# Patient Record
Sex: MALE | Race: WHITE | NOT HISPANIC OR LATINO | Employment: FULL TIME | URBAN - METROPOLITAN AREA
[De-identification: names, ages, dates, MRNs, and addresses within clinical notes are randomized per-mention and may not be internally consistent; named-entity substitution may affect disease eponyms.]

---

## 2017-02-23 ENCOUNTER — LAB REQUISITION (OUTPATIENT)
Dept: LAB | Facility: HOSPITAL | Age: 57
End: 2017-02-23
Payer: COMMERCIAL

## 2017-02-23 DIAGNOSIS — J01.00 ACUTE MAXILLARY SINUSITIS: ICD-10-CM

## 2017-02-23 PROCEDURE — 87147 CULTURE TYPE IMMUNOLOGIC: CPT | Performed by: OTOLARYNGOLOGY

## 2017-02-23 PROCEDURE — 87205 SMEAR GRAM STAIN: CPT | Performed by: OTOLARYNGOLOGY

## 2017-02-23 PROCEDURE — 87070 CULTURE OTHR SPECIMN AEROBIC: CPT | Performed by: OTOLARYNGOLOGY

## 2017-02-23 PROCEDURE — 87186 SC STD MICRODIL/AGAR DIL: CPT | Performed by: OTOLARYNGOLOGY

## 2017-02-27 LAB
BACTERIA WND AEROBE CULT: NORMAL
BACTERIA WND AEROBE CULT: NORMAL
GRAM STN SPEC: NORMAL
GRAM STN SPEC: NORMAL

## 2017-03-29 ENCOUNTER — LAB REQUISITION (OUTPATIENT)
Dept: LAB | Facility: HOSPITAL | Age: 57
End: 2017-03-29
Payer: COMMERCIAL

## 2017-03-29 DIAGNOSIS — H60.399 OTHER INFECTIVE OTITIS EXTERNA, UNSPECIFIED EAR: ICD-10-CM

## 2017-03-29 PROCEDURE — 87102 FUNGUS ISOLATION CULTURE: CPT | Performed by: OTOLARYNGOLOGY

## 2017-03-29 PROCEDURE — 87205 SMEAR GRAM STAIN: CPT | Performed by: OTOLARYNGOLOGY

## 2017-03-29 PROCEDURE — 87070 CULTURE OTHR SPECIMN AEROBIC: CPT | Performed by: OTOLARYNGOLOGY

## 2017-03-29 PROCEDURE — 87107 FUNGI IDENTIFICATION MOLD: CPT | Performed by: OTOLARYNGOLOGY

## 2017-03-29 PROCEDURE — 87147 CULTURE TYPE IMMUNOLOGIC: CPT | Performed by: OTOLARYNGOLOGY

## 2017-03-31 LAB
BACTERIA WND AEROBE CULT: NORMAL
GRAM STN SPEC: NORMAL

## 2017-04-10 LAB — FUNGUS SPEC CULT: NORMAL

## 2017-08-31 ENCOUNTER — TRANSCRIBE ORDERS (OUTPATIENT)
Dept: ADMINISTRATIVE | Facility: HOSPITAL | Age: 57
End: 2017-08-31

## 2017-08-31 DIAGNOSIS — H70.11 CHRONIC MASTOIDITIS OF RIGHT SIDE: Primary | ICD-10-CM

## 2017-09-03 ENCOUNTER — HOSPITAL ENCOUNTER (OUTPATIENT)
Dept: CT IMAGING | Facility: HOSPITAL | Age: 57
Discharge: HOME | End: 2017-09-03
Attending: OTOLARYNGOLOGY
Payer: COMMERCIAL

## 2017-09-03 DIAGNOSIS — H70.11 CHRONIC MASTOIDITIS OF RIGHT SIDE: ICD-10-CM

## 2017-09-03 PROCEDURE — 70480 CT ORBIT/EAR/FOSSA W/O DYE: CPT

## 2019-07-10 PROCEDURE — 87070 CULTURE OTHR SPECIMN AEROBIC: CPT | Performed by: OTOLARYNGOLOGY

## 2019-07-10 PROCEDURE — 87205 SMEAR GRAM STAIN: CPT | Performed by: OTOLARYNGOLOGY

## 2019-07-10 PROCEDURE — 87102 FUNGUS ISOLATION CULTURE: CPT | Performed by: OTOLARYNGOLOGY

## 2019-08-18 ENCOUNTER — APPOINTMENT (EMERGENCY)
Dept: RADIOLOGY | Facility: HOSPITAL | Age: 59
End: 2019-08-18
Payer: COMMERCIAL

## 2019-08-18 ENCOUNTER — HOSPITAL ENCOUNTER (OUTPATIENT)
Facility: HOSPITAL | Age: 59
Setting detail: OBSERVATION
Discharge: HOME | End: 2019-08-19
Attending: EMERGENCY MEDICINE | Admitting: INTERNAL MEDICINE
Payer: COMMERCIAL

## 2019-08-18 DIAGNOSIS — Z90.5 SINGLE KIDNEY: ICD-10-CM

## 2019-08-18 DIAGNOSIS — M54.50 ACUTE RIGHT-SIDED LOW BACK PAIN WITHOUT SCIATICA: ICD-10-CM

## 2019-08-18 DIAGNOSIS — N20.0 NEPHROLITHIASIS: Primary | ICD-10-CM

## 2019-08-18 PROBLEM — G89.29 CHRONIC BACK PAIN: Status: ACTIVE | Noted: 2019-08-18

## 2019-08-18 PROBLEM — M54.9 CHRONIC BACK PAIN: Status: ACTIVE | Noted: 2019-08-18

## 2019-08-18 LAB
ALBUMIN SERPL BCP-MCNC: 4.6 G/DL (ref 3.5–5)
ALP SERPL-CCNC: 139 U/L (ref 46–116)
ALT SERPL W P-5'-P-CCNC: 22 U/L (ref 12–78)
AMORPH URATE CRY URNS QL MICRO: ABNORMAL /HPF
ANION GAP SERPL CALCULATED.3IONS-SCNC: 6 MMOL/L (ref 4–13)
AST SERPL W P-5'-P-CCNC: 15 U/L (ref 5–45)
BACTERIA UR QL AUTO: ABNORMAL /HPF
BASOPHILS # BLD AUTO: 0.04 THOUSANDS/ΜL (ref 0–0.1)
BASOPHILS NFR BLD AUTO: 0 % (ref 0–1)
BILIRUB SERPL-MCNC: 0.42 MG/DL (ref 0.2–1)
BILIRUB UR QL STRIP: NEGATIVE
BILIRUB UR QL STRIP: NEGATIVE
BUN SERPL-MCNC: 22 MG/DL (ref 5–25)
CALCIUM SERPL-MCNC: 9.4 MG/DL (ref 8.3–10.1)
CHLORIDE SERPL-SCNC: 110 MMOL/L (ref 100–108)
CLARITY UR: ABNORMAL
CLARITY UR: CLEAR
CO2 SERPL-SCNC: 26 MMOL/L (ref 21–32)
COLOR UR: ABNORMAL
COLOR UR: ABNORMAL
COLOR, POC: NORMAL
CREAT SERPL-MCNC: 1.14 MG/DL (ref 0.6–1.3)
EOSINOPHIL # BLD AUTO: 0.15 THOUSAND/ΜL (ref 0–0.61)
EOSINOPHIL NFR BLD AUTO: 2 % (ref 0–6)
ERYTHROCYTE [DISTWIDTH] IN BLOOD BY AUTOMATED COUNT: 13.5 % (ref 11.6–15.1)
GFR SERPL CREATININE-BSD FRML MDRD: 70 ML/MIN/1.73SQ M
GLUCOSE SERPL-MCNC: 111 MG/DL (ref 65–140)
GLUCOSE UR STRIP-MCNC: NEGATIVE MG/DL
GLUCOSE UR STRIP-MCNC: NEGATIVE MG/DL
HCT VFR BLD AUTO: 46.3 % (ref 36.5–49.3)
HGB BLD-MCNC: 15.3 G/DL (ref 12–17)
HGB UR QL STRIP.AUTO: ABNORMAL
HGB UR QL STRIP.AUTO: ABNORMAL
IMM GRANULOCYTES # BLD AUTO: 0.04 THOUSAND/UL (ref 0–0.2)
IMM GRANULOCYTES NFR BLD AUTO: 0 % (ref 0–2)
KETONES UR STRIP-MCNC: NEGATIVE MG/DL
KETONES UR STRIP-MCNC: NEGATIVE MG/DL
LEUKOCYTE ESTERASE UR QL STRIP: ABNORMAL
LEUKOCYTE ESTERASE UR QL STRIP: ABNORMAL
LYMPHOCYTES # BLD AUTO: 1.43 THOUSANDS/ΜL (ref 0.6–4.47)
LYMPHOCYTES NFR BLD AUTO: 14 % (ref 14–44)
MCH RBC QN AUTO: 30.3 PG (ref 26.8–34.3)
MCHC RBC AUTO-ENTMCNC: 33 G/DL (ref 31.4–37.4)
MCV RBC AUTO: 92 FL (ref 82–98)
MONOCYTES # BLD AUTO: 0.69 THOUSAND/ΜL (ref 0.17–1.22)
MONOCYTES NFR BLD AUTO: 7 % (ref 4–12)
NEUTROPHILS # BLD AUTO: 7.91 THOUSANDS/ΜL (ref 1.85–7.62)
NEUTS SEG NFR BLD AUTO: 77 % (ref 43–75)
NITRITE UR QL STRIP: NEGATIVE
NITRITE UR QL STRIP: NEGATIVE
NON-SQ EPI CELLS URNS QL MICRO: ABNORMAL /HPF
NRBC BLD AUTO-RTO: 0 /100 WBCS
PH UR STRIP.AUTO: 6.5 [PH]
PH UR STRIP.AUTO: 6.5 [PH] (ref 4.5–8)
PLATELET # BLD AUTO: 280 THOUSANDS/UL (ref 149–390)
PMV BLD AUTO: 10.2 FL (ref 8.9–12.7)
POTASSIUM SERPL-SCNC: 4.7 MMOL/L (ref 3.5–5.3)
PROT SERPL-MCNC: 7.4 G/DL (ref 6.4–8.2)
PROT UR STRIP-MCNC: ABNORMAL MG/DL
PROT UR STRIP-MCNC: ABNORMAL MG/DL
RBC # BLD AUTO: 5.05 MILLION/UL (ref 3.88–5.62)
RBC #/AREA URNS AUTO: ABNORMAL /HPF
SODIUM SERPL-SCNC: 142 MMOL/L (ref 136–145)
SP GR UR STRIP.AUTO: 1.01 (ref 1–1.03)
SP GR UR STRIP.AUTO: 1.01 (ref 1–1.03)
UROBILINOGEN UR QL STRIP.AUTO: 0.2 E.U./DL
UROBILINOGEN UR QL STRIP.AUTO: 0.2 E.U./DL
WBC # BLD AUTO: 10.26 THOUSAND/UL (ref 4.31–10.16)
WBC #/AREA URNS AUTO: ABNORMAL /HPF

## 2019-08-18 PROCEDURE — 93005 ELECTROCARDIOGRAM TRACING: CPT

## 2019-08-18 PROCEDURE — 96374 THER/PROPH/DIAG INJ IV PUSH: CPT

## 2019-08-18 PROCEDURE — 99285 EMERGENCY DEPT VISIT HI MDM: CPT

## 2019-08-18 PROCEDURE — 96361 HYDRATE IV INFUSION ADD-ON: CPT

## 2019-08-18 PROCEDURE — 80053 COMPREHEN METABOLIC PANEL: CPT | Performed by: EMERGENCY MEDICINE

## 2019-08-18 PROCEDURE — 99220 PR INITIAL OBSERVATION CARE/DAY 70 MINUTES: CPT | Performed by: INTERNAL MEDICINE

## 2019-08-18 PROCEDURE — 81001 URINALYSIS AUTO W/SCOPE: CPT | Performed by: STUDENT IN AN ORGANIZED HEALTH CARE EDUCATION/TRAINING PROGRAM

## 2019-08-18 PROCEDURE — 99285 EMERGENCY DEPT VISIT HI MDM: CPT | Performed by: EMERGENCY MEDICINE

## 2019-08-18 PROCEDURE — 36415 COLL VENOUS BLD VENIPUNCTURE: CPT | Performed by: EMERGENCY MEDICINE

## 2019-08-18 PROCEDURE — 85025 COMPLETE CBC W/AUTO DIFF WBC: CPT | Performed by: EMERGENCY MEDICINE

## 2019-08-18 PROCEDURE — 74176 CT ABD & PELVIS W/O CONTRAST: CPT

## 2019-08-18 RX ORDER — SODIUM CHLORIDE, SODIUM GLUCONATE, SODIUM ACETATE, POTASSIUM CHLORIDE, MAGNESIUM CHLORIDE, SODIUM PHOSPHATE, DIBASIC, AND POTASSIUM PHOSPHATE .53; .5; .37; .037; .03; .012; .00082 G/100ML; G/100ML; G/100ML; G/100ML; G/100ML; G/100ML; G/100ML
150 INJECTION, SOLUTION INTRAVENOUS CONTINUOUS
Status: DISCONTINUED | OUTPATIENT
Start: 2019-08-18 | End: 2019-08-19

## 2019-08-18 RX ORDER — CIPROFLOXACIN AND DEXAMETHASONE 3; 1 MG/ML; MG/ML
4 SUSPENSION/ DROPS AURICULAR (OTIC) 2 TIMES DAILY
Status: DISCONTINUED | OUTPATIENT
Start: 2019-08-19 | End: 2019-08-19 | Stop reason: HOSPADM

## 2019-08-18 RX ORDER — ONDANSETRON 2 MG/ML
4 INJECTION INTRAMUSCULAR; INTRAVENOUS ONCE
Status: DISCONTINUED | OUTPATIENT
Start: 2019-08-18 | End: 2019-08-18

## 2019-08-18 RX ORDER — KETOROLAC TROMETHAMINE 30 MG/ML
15 INJECTION, SOLUTION INTRAMUSCULAR; INTRAVENOUS ONCE
Status: DISCONTINUED | OUTPATIENT
Start: 2019-08-18 | End: 2019-08-18

## 2019-08-18 RX ORDER — LIDOCAINE 50 MG/G
1 PATCH TOPICAL ONCE
Status: COMPLETED | OUTPATIENT
Start: 2019-08-18 | End: 2019-08-19

## 2019-08-18 RX ORDER — ACETAMINOPHEN 325 MG/1
650 TABLET ORAL EVERY 6 HOURS PRN
Status: DISCONTINUED | OUTPATIENT
Start: 2019-08-18 | End: 2019-08-19 | Stop reason: HOSPADM

## 2019-08-18 RX ORDER — NICOTINE 21 MG/24HR
1 PATCH, TRANSDERMAL 24 HOURS TRANSDERMAL DAILY
Status: DISCONTINUED | OUTPATIENT
Start: 2019-08-19 | End: 2019-08-19 | Stop reason: HOSPADM

## 2019-08-18 RX ORDER — GABAPENTIN 300 MG/1
300 CAPSULE ORAL 2 TIMES DAILY
Status: DISCONTINUED | OUTPATIENT
Start: 2019-08-19 | End: 2019-08-19 | Stop reason: HOSPADM

## 2019-08-18 RX ORDER — DIAZEPAM 5 MG/ML
5 INJECTION, SOLUTION INTRAMUSCULAR; INTRAVENOUS ONCE
Status: COMPLETED | OUTPATIENT
Start: 2019-08-18 | End: 2019-08-18

## 2019-08-18 RX ORDER — METHOCARBAMOL 500 MG/1
500 TABLET, FILM COATED ORAL ONCE
Status: COMPLETED | OUTPATIENT
Start: 2019-08-18 | End: 2019-08-18

## 2019-08-18 RX ORDER — TAMSULOSIN HYDROCHLORIDE 0.4 MG/1
0.4 CAPSULE ORAL
Status: DISCONTINUED | OUTPATIENT
Start: 2019-08-19 | End: 2019-08-19

## 2019-08-18 RX ADMIN — METHOCARBAMOL TABLETS 500 MG: 500 TABLET, COATED ORAL at 18:14

## 2019-08-18 RX ADMIN — SODIUM CHLORIDE, SODIUM GLUCONATE, SODIUM ACETATE, POTASSIUM CHLORIDE, MAGNESIUM CHLORIDE, SODIUM PHOSPHATE, DIBASIC, AND POTASSIUM PHOSPHATE 150 ML/HR: .53; .5; .37; .037; .03; .012; .00082 INJECTION, SOLUTION INTRAVENOUS at 23:28

## 2019-08-18 RX ADMIN — LIDOCAINE 1 PATCH: 50 PATCH CUTANEOUS at 18:15

## 2019-08-18 RX ADMIN — DIAZEPAM 5 MG: 10 INJECTION, SOLUTION INTRAMUSCULAR; INTRAVENOUS at 18:17

## 2019-08-18 RX ADMIN — SODIUM CHLORIDE 1000 ML: 0.9 INJECTION, SOLUTION INTRAVENOUS at 19:32

## 2019-08-18 NOTE — ED ATTENDING ATTESTATION
Lisa Vila MD, saw and evaluated the patient  I have discussed the patient with the resident/non-physician practitioner and agree with the resident's/non-physician practitioner's findings, Plan of Care, and MDM as documented in the resident's/non-physician practitioner's note, except where noted  All available labs and Radiology studies were reviewed  I was present for key portions of any procedure(s) performed by the resident/non-physician practitioner and I was immediately available to provide assistance  At this point I agree with the current assessment done in the Emergency Department  I have conducted an independent evaluation of this patient a history and physical is as follows:    OA: 59 y/o m c/o back pain  Pt has a history of chronic back pain but notes that at  4pm today, he had acute onset of back pain  Sharp, radiating from right lumbar/flank/rib region  Denies radiation to his groin/testicles or down his leg  Denies associated numbness/weakness/tingling  Notes that his typical back pain is sacral and radiates upwards, dull and constant and generally does not occur in spasms  Pain is worse with ambulation  + nausea and vomiting x 1  Initially felt lightheaded during nausea, currently resolved  Otherwise the pt denies any abdominal pain, no urinary sxms  No cp/pressure/sob  No change in stools  No fevers/chills  Pt took aleve prior to arrival with mild relief  Denies PMH, surgeries or daily medications  Does have a history of OE currently treated with drops  PE, well developed m in NAD, VSS, Nc/AT, MMM, anicteric, neck supple/FROM, RR, lungs CTAB, -w/r, abd soft, NT/ND, +BS, -r/g, - palpable mass + ttp over R lumbar paraspinal region, - LE edema/calf ttp, + 2 distal pulses and capillary refill < 2 sec, -straight leg raise, intact babinski, patellar and ankle reflexes, 5/5 strength and intact sensation x 4, AAO  A/p back pain, msk v kidney stone v infectious process   Labs, u/a, imaging   Gentle IVF hydration, treat accordingly    Critical Care Time  Procedures

## 2019-08-19 ENCOUNTER — APPOINTMENT (OUTPATIENT)
Dept: RADIOLOGY | Facility: HOSPITAL | Age: 59
End: 2019-08-19
Payer: COMMERCIAL

## 2019-08-19 ENCOUNTER — TELEPHONE (OUTPATIENT)
Dept: OTHER | Facility: HOSPITAL | Age: 59
End: 2019-08-19

## 2019-08-19 VITALS
TEMPERATURE: 97.7 F | DIASTOLIC BLOOD PRESSURE: 81 MMHG | BODY MASS INDEX: 27.21 KG/M2 | HEART RATE: 47 BPM | OXYGEN SATURATION: 96 % | RESPIRATION RATE: 17 BRPM | HEIGHT: 74 IN | WEIGHT: 212 LBS | SYSTOLIC BLOOD PRESSURE: 149 MMHG

## 2019-08-19 PROBLEM — Z72.0 TOBACCO ABUSE: Status: ACTIVE | Noted: 2019-08-19

## 2019-08-19 LAB
ANION GAP SERPL CALCULATED.3IONS-SCNC: 4 MMOL/L (ref 4–13)
ATRIAL RATE: 67 BPM
BUN SERPL-MCNC: 19 MG/DL (ref 5–25)
CALCIUM SERPL-MCNC: 8.5 MG/DL (ref 8.3–10.1)
CHLORIDE SERPL-SCNC: 110 MMOL/L (ref 100–108)
CO2 SERPL-SCNC: 25 MMOL/L (ref 21–32)
CREAT SERPL-MCNC: 0.89 MG/DL (ref 0.6–1.3)
ERYTHROCYTE [DISTWIDTH] IN BLOOD BY AUTOMATED COUNT: 13.7 % (ref 11.6–15.1)
GFR SERPL CREATININE-BSD FRML MDRD: 93 ML/MIN/1.73SQ M
GLUCOSE SERPL-MCNC: 92 MG/DL (ref 65–140)
HCT VFR BLD AUTO: 42.8 % (ref 36.5–49.3)
HGB BLD-MCNC: 13.6 G/DL (ref 12–17)
INR PPP: 1.08 (ref 0.84–1.19)
MCH RBC QN AUTO: 29.6 PG (ref 26.8–34.3)
MCHC RBC AUTO-ENTMCNC: 31.8 G/DL (ref 31.4–37.4)
MCV RBC AUTO: 93 FL (ref 82–98)
P AXIS: 74 DEGREES
PLATELET # BLD AUTO: 257 THOUSANDS/UL (ref 149–390)
PMV BLD AUTO: 10.5 FL (ref 8.9–12.7)
POTASSIUM SERPL-SCNC: 4.1 MMOL/L (ref 3.5–5.3)
PR INTERVAL: 146 MS
PROTHROMBIN TIME: 13.6 SECONDS (ref 11.6–14.5)
QRS AXIS: 57 DEGREES
QRSD INTERVAL: 84 MS
QT INTERVAL: 384 MS
QTC INTERVAL: 405 MS
RBC # BLD AUTO: 4.59 MILLION/UL (ref 3.88–5.62)
SODIUM SERPL-SCNC: 139 MMOL/L (ref 136–145)
T WAVE AXIS: 75 DEGREES
VENTRICULAR RATE: 67 BPM
WBC # BLD AUTO: 7.9 THOUSAND/UL (ref 4.31–10.16)

## 2019-08-19 PROCEDURE — 93010 ELECTROCARDIOGRAM REPORT: CPT | Performed by: INTERNAL MEDICINE

## 2019-08-19 PROCEDURE — 85610 PROTHROMBIN TIME: CPT | Performed by: INTERNAL MEDICINE

## 2019-08-19 PROCEDURE — 82360 CALCULUS ASSAY QUANT: CPT | Performed by: STUDENT IN AN ORGANIZED HEALTH CARE EDUCATION/TRAINING PROGRAM

## 2019-08-19 PROCEDURE — 85027 COMPLETE CBC AUTOMATED: CPT | Performed by: STUDENT IN AN ORGANIZED HEALTH CARE EDUCATION/TRAINING PROGRAM

## 2019-08-19 PROCEDURE — 99244 OFF/OP CNSLTJ NEW/EST MOD 40: CPT | Performed by: PHYSICIAN ASSISTANT

## 2019-08-19 PROCEDURE — NC001 PR NO CHARGE: Performed by: INTERNAL MEDICINE

## 2019-08-19 PROCEDURE — 74176 CT ABD & PELVIS W/O CONTRAST: CPT

## 2019-08-19 PROCEDURE — 80048 BASIC METABOLIC PNL TOTAL CA: CPT | Performed by: STUDENT IN AN ORGANIZED HEALTH CARE EDUCATION/TRAINING PROGRAM

## 2019-08-19 RX ORDER — TAMSULOSIN HYDROCHLORIDE 0.4 MG/1
0.4 CAPSULE ORAL
Qty: 30 CAPSULE | Refills: 0 | Status: SHIPPED | OUTPATIENT
Start: 2019-08-20

## 2019-08-19 RX ORDER — TAMSULOSIN HYDROCHLORIDE 0.4 MG/1
0.4 CAPSULE ORAL
Status: DISCONTINUED | OUTPATIENT
Start: 2019-08-19 | End: 2019-08-19 | Stop reason: HOSPADM

## 2019-08-19 RX ORDER — ACETAMINOPHEN 325 MG/1
650 TABLET ORAL EVERY 6 HOURS PRN
Qty: 30 TABLET | Refills: 0 | Status: SHIPPED | OUTPATIENT
Start: 2019-08-19 | End: 2020-02-05

## 2019-08-19 RX ADMIN — GABAPENTIN 300 MG: 300 CAPSULE ORAL at 08:42

## 2019-08-19 NOTE — PLAN OF CARE
Problem: Potential for Falls  Goal: Patient will remain free of falls  Description  INTERVENTIONS:  - Assess patient frequently for physical needs  -  Identify cognitive and physical deficits and behaviors that affect risk of falls    -  [place] fall precautions as indicated by assessment   - Educate patient/family on patient safety including physical limitations  - Instruct patient to call for assistance with activity based on assessment  - Modify environment to reduce risk of injury  - Consider OT/PT consult to assist with strengthening/mobility  Outcome: Adequate for Discharge     Problem: PAIN - ADULT  Goal: Verbalizes/displays adequate comfort level or baseline comfort level  Description  Interventions:  - Encourage patient to monitor pain and request assistance  - Assess pain using appropriate pain scale  - Administer analgesics based on type and severity of pain and evaluate response  - Implement non-pharmacological measures as appropriate and evaluate response  - Consider cultural and social influences on pain and pain management  - Notify physician/advanced practitioner if interventions unsuccessful or patient reports new pain  Outcome: Adequate for Discharge     Problem: INFECTION - ADULT  Goal: Absence or prevention of progression during hospitalization  Description  INTERVENTIONS:  - Assess and monitor for signs and symptoms of infection  - Monitor lab/diagnostic results  - Monitor all insertion sites, i e  indwelling lines, tubes, and drains  - Monitor endotracheal if appropriate and nasal secretions for changes in amount and color  - [place] appropriate cooling/warming therapies per order  - Administer medications as ordered  - Instruct and encourage patient and family to use good hand hygiene technique  - Identify and instruct in appropriate isolation precautions for identified infection/condition  Outcome: Adequate for Discharge  Goal: Absence of fever/infection during neutropenic period  Description  INTERVENTIONS:  - Monitor WBC    Outcome: Adequate for Discharge     Problem: SAFETY ADULT  Goal: Maintain or return to baseline ADL function  Description  INTERVENTIONS:  -  Assess patient's ability to carry out ADLs; assess patient's baseline for ADL function and identify physical deficits which impact ability to perform ADLs (bathing, care of mouth/teeth, toileting, grooming, dressing, etc )  - Assess/evaluate cause of self-care deficits   - Assess range of motion  - Assess patient's mobility; develop plan if impaired  - Assess patient's need for assistive devices and provide as appropriate  - Encourage maximum independence but intervene and supervise when necessary  - Involve family in performance of ADLs  - Assess for home care needs following discharge   - Consider OT consult to assist with ADL evaluation and planning for discharge  - Provide patient education as appropriate  Outcome: Adequate for Discharge  Goal: Maintain or return mobility status to optimal level  Description  INTERVENTIONS:  - Assess patient's baseline mobility status (ambulation, transfers, stairs, etc )    - Identify cognitive and physical deficits and behaviors that affect mobility  - Identify mobility aids required to assist with transfers and/or ambulation (gait belt, sit-to-stand, lift, walker, cane, etc )  - [place] fall precautions as indicated by assessment  - Record patient progress and toleration of activity level on Mobility SBAR; progress patient to next Phase/Stage  - Instruct patient to call for assistance with activity based on assessment  - Consider rehabilitation consult to assist with strengthening/weightbearing, etc   Outcome: Adequate for Discharge     Problem: DISCHARGE PLANNING  Goal: Discharge to home or other facility with appropriate resources  Description  INTERVENTIONS:  - Identify barriers to discharge w/patient and caregiver  - Arrange for needed discharge resources and transportation as appropriate  - Identify discharge learning needs (meds, wound care, etc )  - Arrange for interpretive services to assist at discharge as needed  - Refer to Case Management Department for coordinating discharge planning if the patient needs post-hospital services based on physician/advanced practitioner order or complex needs related to functional status, cognitive ability, or social support system  Outcome: Adequate for Discharge     Problem: Knowledge Deficit  Goal: Patient/family/caregiver demonstrates understanding of disease process, treatment plan, medications, and discharge instructions  Description  Complete learning assessment and assess knowledge base    Interventions:  - Provide teaching at level of understanding  - Provide teaching via preferred learning methods  Outcome: Adequate for Discharge

## 2019-08-19 NOTE — PLAN OF CARE
Problem: Potential for Falls  Goal: Patient will remain free of falls  Description  INTERVENTIONS:  - Assess patient frequently for physical needs  -  Identify cognitive and physical deficits and behaviors that affect risk of falls    -  [place] fall precautions as indicated by assessment   - Educate patient/family on patient safety including physical limitations  - Instruct patient to call for assistance with activity based on assessment  - Modify environment to reduce risk of injury  - Consider OT/PT consult to assist with strengthening/mobility  Outcome: Progressing     Problem: PAIN - ADULT  Goal: Verbalizes/displays adequate comfort level or baseline comfort level  Description  Interventions:  - Encourage patient to monitor pain and request assistance  - Assess pain using appropriate pain scale  - Administer analgesics based on type and severity of pain and evaluate response  - Implement non-pharmacological measures as appropriate and evaluate response  - Consider cultural and social influences on pain and pain management  - Notify physician/advanced practitioner if interventions unsuccessful or patient reports new pain  Outcome: Progressing     Problem: INFECTION - ADULT  Goal: Absence or prevention of progression during hospitalization  Description  INTERVENTIONS:  - Assess and monitor for signs and symptoms of infection  - Monitor lab/diagnostic results  - Monitor all insertion sites, i e  indwelling lines, tubes, and drains  - Monitor endotracheal if appropriate and nasal secretions for changes in amount and color  - [place] appropriate cooling/warming therapies per order  - Administer medications as ordered  - Instruct and encourage patient and family to use good hand hygiene technique  - Identify and instruct in appropriate isolation precautions for identified infection/condition  Outcome: Progressing  Goal: Absence of fever/infection during neutropenic period  Description  INTERVENTIONS:  - Monitor WBC    Outcome: Progressing     Problem: SAFETY ADULT  Goal: Maintain or return to baseline ADL function  Description  INTERVENTIONS:  -  Assess patient's ability to carry out ADLs; assess patient's baseline for ADL function and identify physical deficits which impact ability to perform ADLs (bathing, care of mouth/teeth, toileting, grooming, dressing, etc )  - Assess/evaluate cause of self-care deficits   - Assess range of motion  - Assess patient's mobility; develop plan if impaired  - Assess patient's need for assistive devices and provide as appropriate  - Encourage maximum independence but intervene and supervise when necessary  - Involve family in performance of ADLs  - Assess for home care needs following discharge   - Consider OT consult to assist with ADL evaluation and planning for discharge  - Provide patient education as appropriate  Outcome: Progressing  Goal: Maintain or return mobility status to optimal level  Description  INTERVENTIONS:  - Assess patient's baseline mobility status (ambulation, transfers, stairs, etc )    - Identify cognitive and physical deficits and behaviors that affect mobility  - Identify mobility aids required to assist with transfers and/or ambulation (gait belt, sit-to-stand, lift, walker, cane, etc )  - [place] fall precautions as indicated by assessment  - Record patient progress and toleration of activity level on Mobility SBAR; progress patient to next Phase/Stage  - Instruct patient to call for assistance with activity based on assessment  - Consider rehabilitation consult to assist with strengthening/weightbearing, etc   Outcome: Progressing     Problem: DISCHARGE PLANNING  Goal: Discharge to home or other facility with appropriate resources  Description  INTERVENTIONS:  - Identify barriers to discharge w/patient and caregiver  - Arrange for needed discharge resources and transportation as appropriate  - Identify discharge learning needs (meds, wound care, etc )  - Arrange for interpretive services to assist at discharge as needed  - Refer to Case Management Department for coordinating discharge planning if the patient needs post-hospital services based on physician/advanced practitioner order or complex needs related to functional status, cognitive ability, or social support system  Outcome: Progressing     Problem: Knowledge Deficit  Goal: Patient/family/caregiver demonstrates understanding of disease process, treatment plan, medications, and discharge instructions  Description  Complete learning assessment and assess knowledge base    Interventions:  - Provide teaching at level of understanding  - Provide teaching via preferred learning methods  Outcome: Progressing

## 2019-08-19 NOTE — TELEPHONE ENCOUNTER
Castro Ortiz is a 28-year-old male seen in consultation at [address] [facility] for obstructing ureteral calculus with spontaneous expulsion  Please contact patient for 4 week follow-up with Tai Ron  Thank you

## 2019-08-19 NOTE — H&P
History & Physical - SOD A      PATIENT INFORMATION      Alli Barraza 60 y o  male MRN: [id number]  Unit/Bed#: -04 Encounter: [id number]  Admitting Physician: Molly Srivastava DO  PCP: No primary care provider on file  Date of Admission:  08/18/19      ASSESSMENTS & PLAN     No problem-specific Assessment & Plan notes found for this encounter  1   Nephrolithiasis  · Vital stable, WBC of 10 26, UA showed blood  · CT scan showed 4 mm obstructing calculus in the right ureterovesical junction  · Currently on 150 cc of isolyte  · Flomax 0 4 mg ordered  · Tylenol 650 prn ordered, Morphine 2 mg injection p r n  ordered  · Urine strain ordered  · Patient feels much better than on presentation  He is making good urine output, normal color  · Follow up CBC and BMP, Protime Ordered  · Can consider urology consult  · Continue to monitor  2   Tobacco abuse  · Has 35 pack year history  · Nicotine patch ordered  · Counseled on the risks of tobacco abuse  · Currently no PCP, told to follow up for regular screenings    3  Chronic back pain  · Currently stable  · Continue gabapentin 300 mg BID  · Continue to monitor    3  Chronic otitis externa  · Currently stable  · Continue cipro-dexa drops  · Continue to monitor    VTE Pharmacologic Prophylaxis: Reason for no pharmacologic prophylaxis low risk   VTE Mechanical Prophylaxis: SCD's      CHIEF COMPLAINT      Flank pain      HISTORY OF PRESENT ILLNESS      Alli Barraza is a 60 y o  male past medical history significant for chronic back pain and tobacco abuse who presents with right-sided flank pain since 3:00 p m  Patient describes as sharp, 10/10 pain that radiates from the right side of the back to the groin  He denies any history of kidney stones  Denies any dysuria or hematuria  Was able to urinate  Took 2 Aleve to no relief  Pain was accompanied by nausea and 1 episode of vomiting    Denied any other symptoms including fevers, chills, chest pain, shortness of breath  In ED, vital signs are stable, WBC noted to be 10 26, UA showed large blood  CT scan showed 4 mm obstructing calculus in the right UV junction  Incidental finding of no left kidney and splenic cyst   Was given Lidoderm patch, 1 L nasal saline bolus  Currently patient seen examined lying in bed in no acute distress  Accompanied by daughter  He states pain has vastly improved since presentation, about a 1/10 with some soreness  He did urinate after being moved to the floors, states it was large amount, normal color, did not note stone  Denies any other acute complaints at this time  States he does not have PCP, has not had physical for 10 years  Last saw an otolaryngologist for chronic otitis externa  REVIEW OF SYSTEMS       ROS  CONSTITUTIONAL: Denies any fever, chills  HEENT: No earache or tinnitus  Denies hearing loss or visual disturbances  CARDIOVASCULAR: No chest pain or palpitations  RESPIRATORY: Denies any cough, hemoptysis, shortness of breath or dyspnea on exertion  GASTROINTESTINAL: Denies abdominal pain, nausea, vomitng   GENITOURINARY: Positive flank pain  No problems with urination  Denies any hematuria or dysuria  NEUROLOGIC: No dizziness or vertigo, denies headaches  MUSCULOSKELETAL: Denies any muscle or joint pain  SKIN: Denies skin rashes or itching  All other systems reviewed and are negative  PAST MEDICAL & SURGICAL HISTORY      Past Medical History:   Diagnosis Date    Arthritis of back     Scoliosis        History reviewed  No pertinent surgical history  MEDICATIONS & ALLERGIES     Prior to Admission medications    Medication Sig Start Date End Date Taking?  Authorizing Provider   ciprofloxacin-dexamethasone (CIPRODEX) otic suspension Administer 4 drops into the left ear 2 (two) times a day 7/10/19   Jamel Deras MD   ciprofloxacin-dexamethasone [facility]) otic suspension Administer 4 drops into the left ear 2 (two) times a day 8/14/19   Abigail Saldana MD   clotrimazole 1 % external solution Apply 1 application topically 2 (two) times a day 8/14/19   Abigail Saldana MD   gabapentin (NEURONTIN) 300 mg capsule  8/7/19   Historical Provider, MD         Allergies: No Known Allergies      SOCIAL HISTORY      Substance Use History:   Social History     Substance and Sexual Activity   Alcohol Use Not Currently    Frequency: Never     Social History     Tobacco Use   Smoking Status Current Every Day Smoker    Packs/day: 1 00    Types: Cigarettes   Smokeless Tobacco Never Used     Social History     Substance and Sexual Activity   Drug Use Never         FAMILY HISTORY      Family History   Problem Relation Age of Onset    Cancer Mother        PHYSICAL EXAM      Vitals:   Blood Pressure: 148/75 (08/18/19 2205)  Pulse: 61 (08/18/19 2205)  Temperature: 98 °F (36 7 °C) (08/18/19 2205)  Temp Source: Oral (08/18/19 1734)  Respirations: 17 (08/18/19 2205)  Height: 6' 2" (188 cm) (08/18/19 1734)  Weight - Scale: 96 2 kg (212 lb) (08/18/19 1734)  SpO2: 99 % (08/18/19 2205)    Physical Exam:   GENERAL: NAD, appears stated age  HEENT:  NC/AT, PERRL, EOMI, MMM, no scleral icterus  CARDIAC:  RRR, +S1/S2, no S3/S4 heard, no m/g/r  PULMONARY:  CTA B/L, no wheezing/rales/rhonci, non-labored breathing  ABDOMEN:  Soft, NT/ND, +BS, no rebound/guarding/rigidity  : No CVA tenderness, no suprapubic tenderness  Extremities:  2+ Pulses in DP/PT  No edema, cyanosis, or clubbing  NEUROLOGIC:  Alert/oriented x3   No motor or sensory deficits  SKIN:  No rashes or erythema    ADDITIONAL DATA     Lab Results:     Results from last 7 days   Lab Units 08/18/19  1809   WBC Thousand/uL 10 26*   HEMOGLOBIN g/dL 15 3   HEMATOCRIT % 46 3   PLATELETS Thousands/uL 280   NEUTROS PCT % 77*   LYMPHS PCT % 14   MONOS PCT % 7   EOS PCT % 2     Results from last 7 days   Lab Units 08/18/19  1809   POTASSIUM mmol/L 4 7   CHLORIDE mmol/L 110*   CO2 mmol/L 26   BUN mg/dL 22 CREATININE mg/dL 1 14   CALCIUM mg/dL 9 4   ALK PHOS U/L 139*   ALT U/L 22   AST U/L 15           Imaging:     Ct Abdomen Pelvis Wo Contrast    Result Date: 8/18/2019  Narrative: CT ABDOMEN AND PELVIS WITHOUT IV CONTRAST INDICATION:   Flank pain, stone disease suspected  COMPARISON:  None  TECHNIQUE:  CT examination of the abdomen and pelvis was performed without intravenous contrast   Axial, sagittal, and coronal 2D reformatted images were created from the source data and submitted for interpretation  Radiation dose length product (DLP) for this visit:  23-14-20-09 mGy-cm   This examination, like all CT scans performed in the [facility], was performed utilizing techniques to minimize radiation dose exposure, including the use of iterative reconstruction and automated exposure control  Enteric contrast was not administered  FINDINGS: ABDOMEN LOWER CHEST:  No clinically significant abnormality identified in the visualized lower chest  LIVER/BILIARY TREE:  Unremarkable  GALLBLADDER:  No calcified gallstones  No pericholecystic inflammatory change  SPLEEN: Normal size  3 1 x 3 7 cm hypodense lesion in the central spleen, incompletely characterized  PANCREAS:  Unremarkable  ADRENAL GLANDS:  Unremarkable  KIDNEYS/URETERS:  4 mm obstructing calculus at the right ureterovesical junction causing mild right hydroureteronephrosis  There is mild diffuse right perinephric inflammatory stranding  Additional scattered punctate right renal calculi are present  Left kidney is absent  STOMACH AND BOWEL:  Unremarkable  APPENDIX:  A normal appendix was visualized  ABDOMINOPELVIC CAVITY:  No ascites or free intraperitoneal air  No lymphadenopathy  VESSELS:  Atherosclerotic changes are present  No evidence of aneurysm  PELVIS REPRODUCTIVE ORGANS:  Unremarkable for patient's age  URINARY BLADDER:  Unremarkable  ABDOMINAL WALL/INGUINAL REGIONS:  Unremarkable   OSSEOUS STRUCTURES:  No acute fracture or destructive osseous lesion  Impression: 4 mm obstructing calculus at the right ureterovesical junction with associated mild hydroureteronephrosis  Left kidney is absent  Hypodense splenic lesion is possibly a cyst but incompletely characterized  Further evaluation with ultrasound is suggested if clinically indicated  I personally discussed this study with Evelin Montero on 8/18/2019 at 7:39 PM   Workstation performed: [id number]       EKG, Pathology, and Other Studies Reviewed on Admission:   · EKG: Reviewed      Code Status: Level 1 - Full Code  Admission Status: [facility]  Internal Medicine PGY-1  8/18/2019 10:42 PM      Total Time for Visit, including Counseling / Coordination of Care: 1 hour total time spent admitting patient  Greater than 50% of this total time spent on direct patient counseling and coordination of care     ** Please Note: This note is constructed using a voice recognition dictation system   **

## 2019-08-19 NOTE — DISCHARGE SUMMARY
INTERNAL MEDICINE RESIDENCY DISCHARGE SUMMARY     Purnima Quezada   60 y o  male  MRN: [id number]  Room/Bed: /-01     [place] [address]   Encounter: [id number]    Principal Problem:    Nephrolithiasis  Active Problems:    Chronic back pain    Single kidney    Tobacco abuse      Assessment plan:  Please see progress note from today, 08/19/2019    [address] COURSE     This is 59-year-old male with past medical history of chronic back pain, no previous history of nephrolithiasis and tobacco abuse came to the hospital with chief complaint of right-sided flank pain radiating to his right groin, nausea and single episode of nonbilious nonbloody vomiting on 08/18/19  The pain was sharp and 10/10  On presents to the ED, WBC was 10 26 and UA showed large blood with some leukocytes  CT scan showed 4 mm obstructing calculus in the right UV junction and incidental finding of no left kidney and splenic cyst  Patient's pain was well controlled with lidocaine patch, Tylenol and morphine overnight  Patient was also started on Flomax  Urology consult was placed and they recommended repeat CT with low radiation which showed interval passage of the right UVJ calculus seen within the dependent bladder  Patient was discharged the next day on 08/19/19  Patient was asked to follow up with urology PA within 4 weeks  Patient was educated to increase fluid intake and have low-sodium diet  Patient was stable on the day of discharge with stable vitals and 0/10 flank pain  Physical exam on the day of discharge:    Physical Exam   Constitutional: He is oriented to person, place, and time  He appears well-developed and well-nourished  No distress  HENT:   Head: Normocephalic and atraumatic  Nose: Nose normal    Mouth/Throat: No oropharyngeal exudate  Eyes: Conjunctivae are normal  No scleral icterus  Neck: Neck supple  No JVD present   No tracheal deviation present  No thyromegaly present  Cardiovascular: Normal rate, regular rhythm, normal heart sounds and intact distal pulses  Exam reveals no gallop and no friction rub  No murmur heard  Pulmonary/Chest: Effort normal and breath sounds normal  No stridor  No respiratory distress  He has no wheezes  He has no rales  He exhibits no tenderness  Abdominal: Soft  Bowel sounds are normal  He exhibits no distension and no mass  There is no tenderness  There is no rebound and no guarding  No CVA tenderness  Musculoskeletal: Normal range of motion  He exhibits no tenderness  Neurological: He is alert and oriented to person, place, and time  No sensory deficit  Skin: Skin is warm and dry  No rash noted  He is not diaphoretic  No erythema  No pallor  Psychiatric: He has a normal mood and affect  His behavior is normal  Judgment and thought content normal            DISCHARGE INFORMATION     PCP at Discharge: none    Admitting Provider: Jeffrey John MD  Admission Date: 8/18/2019    Discharge Provider: Jeffrey John MD  Discharge Date: 08/19/19    Discharge Disposition: Final discharge disposition not confirmed  Discharge Condition: good  Discharge with Lines: no    Discharge Diet: encourage fluids  Activity Restrictions: none  Test Results Pending at Discharge: Renal stone analysis    Discharge Diagnoses:  Principal Problem:    Nephrolithiasis  Active Problems:    Chronic back pain    Single kidney    Tobacco abuse  Resolved Problems:    * No resolved hospital problems  *      Consulting Providers:   neurology: Sandro Nova PA-C    Diagnostic & Therapeutic Procedures Performed:  Ct Abdomen Pelvis Wo Contrast    Result Date: 8/18/2019  Impression: 4 mm obstructing calculus at the right ureterovesical junction with associated mild hydroureteronephrosis  Left kidney is absent  Hypodense splenic lesion is possibly a cyst but incompletely characterized    Further evaluation with ultrasound is suggested if clinically indicated  I personally discussed this study with Rex Sow on 8/18/2019 at 7:39 PM   Workstation performed: [id number]     Ct Renal Stone Study Abdomen Pelvis Wo Contrast    Result Date: 8/19/2019  Impression: Interval passage of right UVJ calculus now seen within the dependent bladder  Workstation performed: [id number]       Code Status: Level 1 - Full Code  Advance Directive & Living Will: <no information>  Power of :    POLST:      Medications:  Current Discharge Medication List        Current Discharge Medication List      START taking these medications    Details   acetaminophen (TYLENOL) 325 mg tablet Take 2 tablets (650 mg total) by mouth every 6 (six) hours as needed for mild pain or moderate pain  Qty: 30 tablet, Refills: 0    Associated Diagnoses: Nephrolithiasis      tamsulosin (FLOMAX) 0 4 mg Take 1 capsule (0 4 mg total) by mouth daily with dinner  Qty: 30 capsule, Refills: 0    Associated Diagnoses: Nephrolithiasis           Current Discharge Medication List      CONTINUE these medications which have NOT CHANGED    Details   !! ciprofloxacin-dexamethasone (CIPRODEX) otic suspension Administer 4 drops into the left ear 2 (two) times a day  Qty: 7 5 mL, Refills: 0    Associated Diagnoses: Other infective chronic otitis externa of left ear      !! ciprofloxacin-dexamethasone (CIPRODEX) otic suspension Administer 4 drops into the left ear 2 (two) times a day  Qty: 7 5 mL, Refills: 0    Associated Diagnoses: Other infective chronic otitis externa of left ear      clotrimazole 1 % external solution Apply 1 application topically 2 (two) times a day  Qty: 30 mL, Refills: 0    Associated Diagnoses: Other infective chronic otitis externa of left ear      gabapentin (NEURONTIN) 300 mg capsule        !! - Potential duplicate medications found  Please discuss with provider            Allergies:  No Known Allergies    FOLLOW-UP     Name Relationship Specialty Phone Fax Address Order Amber Cross PA-C   Urology  Physician Assistant [phone] [place] [address]           Discharge Statement:   I spent 45 minutes minutes discharging the patient  This time was spent on the day of discharge  I had direct contact with the patient on the day of discharge  Additional documentation is required if more than 30 minutes were spent on discharge  Portions of the record may have been created with voice recognition software  Occasional wrong word or "sound a like" substitutions may have occurred due to the inherent limitations of voice recognition software    Read the chart carefully and recognize, using context, where substitutions have occurred     ==  Otilio Norton, [facility]  Internal Medicine Resident PGY-1

## 2019-08-19 NOTE — PROGRESS NOTES
INTERNAL MEDICINE RESIDENCY PROGRESS NOTE     Name: Tima House   Age & Sex: 60 y o  male   MRN: [id number]  Unit/Bed#: -01   Encounter: [id number]  Team: SOD Team A    PATIENT INFORMATION     Name: Tima House   Age & Sex: 60 y o  male   MRN: [id number]  Hospital Stay Days: 0    ASSESSMENT/PLAN     Principal Problem:    Nephrolithiasis  Active Problems:    Chronic back pain    Single kidney    Tobacco abuse    1  Right-sided nephrolithiasis  Patient came to the hospital with right-sided flank pain without any systemic symptoms  Patient was found to have 4 mm obstructing calculus at the right UV junction, absent left kidney, hypodense splenic lesion possibly a cyst   UA showed large blood and small leukocytes  CBC and BMP unremarkable this morning  Plan:  - Urology recs appreciated- ordered repeat CT stone with limited radiation, further care will depend on the imaging study  - pain well controlled- on p r n  Morphine, lidocaine patch, Tylenol  - will stop his IV fluids  - patient on Flomax  2  Tobacco abuse  Patient has 35 pack year smoking history  Patient has no PCP  Plan:   - nicotine patch ordered  Follow up with PCP outpatient  3  Chronic back pain  Patient is currently stable  Plan:  - continue gabapentin 300 mg b i d     4  Chronic otitis externa  Patient currently stable  No effusions on physical exam     Plan:  - continue to monitor, continue Cipro [place] drops  Disposition:  Inpatient consult to Urology  SUBJECTIVE     Patient seen and examined  No acute events overnight  No new symptoms      OBJECTIVE     Vitals:    19 2030 19 2205 19 0722 19 0723   BP: 122/58 148/75 149/81 149/81   BP Location: Left arm      Pulse: 66 61 (!) 48 (!) 47   Resp: 18 17     Temp:  98 °F (36 7 °C) 97 7 °F (36 5 °C) 97 7 °F (36 5 °C)   TempSrc:       SpO2: 96% 99% 97% 96%   Weight:       Height:          Temperature:   Temp (24hrs), Av 8 °F (36 6 °C), Min:97 7 °F (36 5 °C), Max:98 °F (36 7 °C)    Temperature: 97 7 °F (36 5 °C)  Intake & Output:  I/O       08/17 0701 - 08/18 0700 08/18 0701 - 08/19 0700 08/19 0701 - 08/20 0700    Urine (mL/kg/hr)  625     Total Output  625     Net  -625                Weights:   IBW: 82 2 kg    Body mass index is 27 22 kg/m²  Weight (last 2 days)     Date/Time   Weight    08/18/19 1734   96 2 (212)            Physical Exam   Constitutional: He is oriented to person, place, and time  He appears well-developed and well-nourished  No distress  HENT:   Head: Normocephalic and atraumatic  Nose: Nose normal    Mouth/Throat: Oropharynx is clear and moist  No oropharyngeal exudate  Eyes: Right eye exhibits no discharge  Left eye exhibits no discharge  No scleral icterus  Neck: Normal range of motion  Neck supple  Cardiovascular: Normal rate, regular rhythm, normal heart sounds and intact distal pulses  Exam reveals no gallop and no friction rub  No murmur heard  Pulmonary/Chest: Effort normal  No stridor  No respiratory distress  He has no wheezes  He has no rales  Abdominal: Soft  Bowel sounds are normal  He exhibits no distension  There is no tenderness  There is no guarding  Musculoskeletal: Normal range of motion  He exhibits no edema  Mild right CVA tenderness  Neurological: He is alert and oriented to person, place, and time  Skin: Skin is warm  He is not diaphoretic  No erythema  Psychiatric: He has a normal mood and affect  Vitals reviewed  LABORATORY DATA     Labs: I have personally reviewed pertinent reports    Results from last 7 days   Lab Units 08/19/19  0517 08/18/19  1809   WBC Thousand/uL 7 90 10 26*   HEMOGLOBIN g/dL 13 6 15 3   HEMATOCRIT % 42 8 46 3   PLATELETS Thousands/uL 257 280   NEUTROS PCT %  --  77*   MONOS PCT %  --  7      Results from last 7 days   Lab Units 08/19/19  0517 08/18/19  1809   POTASSIUM mmol/L 4 1 4 7   CHLORIDE mmol/L 110* 110*   CO2 mmol/L 25 26   BUN mg/dL 19 22 CREATININE mg/dL 0 89 1 14   CALCIUM mg/dL 8 5 9 4   ALK PHOS U/L  --  139*   ALT U/L  --  22   AST U/L  --  15              Results from last 7 days   Lab Units 08/19/19  0517   INR  1 08               IMAGING & DIAGNOSTIC TESTING     Radiology Results: I have personally reviewed pertinent reports  Ct Abdomen Pelvis Wo Contrast    Result Date: 8/18/2019  Impression: 4 mm obstructing calculus at the right ureterovesical junction with associated mild hydroureteronephrosis  Left kidney is absent  Hypodense splenic lesion is possibly a cyst but incompletely characterized  Further evaluation with ultrasound is suggested if clinically indicated  I personally discussed this study with Dottie Whyte on 8/18/2019 at 7:39 PM   Workstation performed: [id number]     Other Diagnostic Testing: I have personally reviewed pertinent reports  ACTIVE MEDICATIONS     Current Facility-Administered Medications   Medication Dose Route Frequency    acetaminophen (TYLENOL) tablet 650 mg  650 mg Oral Q6H PRN    ciprofloxacin-dexamethasone (CIPRODEX) 0 3-0 1 % otic suspension 4 drop  4 drop Left Ear BID    gabapentin (NEURONTIN) capsule 300 mg  300 mg Oral BID    morphine injection 2 mg  2 mg Intravenous Q4H PRN    nicotine (NICODERM CQ) 14 mg/24hr TD 24 hr patch 1 patch  1 patch Transdermal Daily    tamsulosin (FLOMAX) capsule 0 4 mg  0 4 mg Oral Daily With Dinner       VTE Pharmacologic Prophylaxis: Sequential compression device (Venodyne)   VTE Mechanical Prophylaxis: sequential compression device    Portions of the record may have been created with voice recognition software  Occasional wrong word or "sound a like" substitutions may have occurred due to the inherent limitations of voice recognition software    Read the chart carefully and recognize, using context, where substitutions have occurred   ==  Rupert Gregory, [facility]  Internal Medicine Residency PGY-1

## 2019-08-19 NOTE — UTILIZATION REVIEW
Initial Clinical Review    Admission: Date/Time/Statement: 8/19/2019 2032    Orders Placed This Encounter   Procedures    Place in Observation     Standing Status:   Standing     Number of Occurrences:   1     Order Specific Question:   Admitting Physician     Answer:   Shea Sahu [[id number]]     Order Specific Question:   Level of Care     Answer:   Med Surg [[id number]]     ED Arrival Information     Expected Arrival Acuity Means of Arrival Escorted By Service Admission Type    - 8/18/2019 17:30 Urgent Ambulance [facility] General Medicine Urgent    Arrival Complaint    back pain        Chief Complaint   Patient presents with    Back Pain     Pt presents to the ED via EMS  Pt has Hx of back problems and is supposed to have an MRI on wednesday but reports this pain is differnt than his daily back pain      Assessment/Plan: 60 yr old male to the ed from home who has chronic back pain but presented with right flank pain since 3am which is sharp and radiates from the right side of his back to the groin  This is different from his normal back pain  He took aleve at home with no relief and had nausea and vomited x1  Ct scan done--  4 mm obstructing calculus at the right ureterovesical junction with associated mild hydroureteronephrosis   Left kidney is absent  Hypodense splenic lesion is possibly a cyst but incompletely characterized   Further evaluation with ultrasound is suggested if clinically indicated  He is being admitted as an observation with nephrolithiasis and the plan is urology consult, strain urine, pain control with tylenol and ms prn, flomax and iv isolyte  Repeat ct scan today if the stone has passed he will be d/c and if the stone is there may have ureteroscopy later today        ED Triage Vitals [08/18/19 1734]   Temperature Pulse Respirations Blood Pressure SpO2   97 7 °F (36 5 °C) 67 19 155/73 96 %      Temp Source Heart Rate Source Patient Position - Orthostatic VS BP Location FiO2 (%)   Oral Monitor Lying Left arm --      Pain Score       5        Wt Readings from Last 1 Encounters:   08/18/19 96 2 kg (212 lb)     Additional Vital Signs:  08/19/19 07:23:25  97 7 °F (36 5 °C)  47Abnormal   --  149/81  104  96 %     08/19/19 07:22:36  97 7 °F (36 5 °C)  48Abnormal   --  149/81  104  97 %     08/18/19 22:05:16  98 °F (36 7 °C)  61  17  148/75  99  99 %     08/18/19 2030  --  66  18  122/58  --  96 %          Pertinent Labs/Diagnostic Test Results:   Lab Units 08/19/19  0517 08/18/19  1809   WBC Thousand/uL 7 90 10 26*   HEMOGLOBIN g/dL 13 6 15 3   HEMATOCRIT % 42 8 46 3   PLATELETS Thousands/uL 257 280   NEUTROS ABS Thousands/µL  --  7 91*         Results from last 7 days   Lab Units 08/19/19  0517 08/18/19  1809   SODIUM mmol/L 139 142   POTASSIUM mmol/L 4 1 4 7   CHLORIDE mmol/L 110* 110*   CO2 mmol/L 25 26   ANION GAP mmol/L 4 6   BUN mg/dL 19 22   CREATININE mg/dL 0 89 1 14   EGFR ml/min/1 73sq m 93 70   CALCIUM mg/dL 8 5 9 4     Results from last 7 days   Lab Units 08/18/19  1809   AST U/L 15   ALT U/L 22   ALK PHOS U/L 139*   TOTAL PROTEIN g/dL 7 4   ALBUMIN g/dL 4 6   TOTAL BILIRUBIN mg/dL 0 42         Results from last 7 days   Lab Units 08/19/19  0517 08/18/19  1809   GLUCOSE RANDOM mg/dL 92 111       Results from last 7 days   Lab Units 08/19/19  0517   PROTIME seconds 13 6   INR  1 08       Results from last 7 days   Lab Units 08/18/19  2220 08/18/19  2207 08/18/19 2049 08/18/19 2048   CLARITY UA   --  Cloudy  --  Clear   COLOR UA   --  Dk Yellow - Itzel   SPEC GRAV UA   --  1 013  --  1 015   PH UA   --  6 5  --  6 5   GLUCOSE UA mg/dl  --  Negative  --  Negative   KETONES UA mg/dl  --  Negative  --  Negative   BLOOD UA   --  Large*  --  Large*   PROTEIN UA mg/dl  --  Trace*  --  30 (1+)*   NITRITE UA   --  Negative  --  Negative   BILIRUBIN UA   --  Negative  --  Negative   UROBILINOGEN UA E U /dl  --  0 2  --  0 2   LEUKOCYTES UA   --  Small*  --  Small*   WBC UA /hpf 4-10*  -- --   --    RBC UA /hpf Innumerable*  --   --   --    BACTERIA UA /hpf None Seen  --   --   --    EPITHELIAL CELLS WET PREP /hpf None Seen  --   --   --      ED Treatment:   Medication Administration from 08/18/2019 1730 to 08/18/2019 2133       Date/Time Order Dose Route Action     08/18/2019 1814 methocarbamol (ROBAXIN) tablet 500 mg 500 mg Oral Given     08/18/2019 1815 lidocaine (LIDODERM) 5 % patch 1 patch 1 patch Topical Medication Applied     08/18/2019 1817 diazepam (VALIUM) injection 5 mg 5 mg Intravenous Given     08/18/2019 1932 sodium chloride 0 9 % bolus 1,000 mL 1,000 mL Intravenous New Bag        Past Medical History:   Diagnosis Date    Arthritis of back     Scoliosis          Admitting Diagnosis: Back pain [M54 9]  Nephrolithiasis [N20 0]  Single kidney [Z90 5]  Acute right-sided low back pain without sciatica [M54 5]  Age/Sex: 60 y o  male  Admission Orders:    Current Facility-Administered Medications:  acetaminophen 650 mg Oral Q6H PRN   ciprofloxacin-dexamethasone 4 drop Left Ear BID   gabapentin 300 mg Oral BID   morphine injection 2 mg Intravenous Q4H PRN   nicotine 1 patch Transdermal Daily   tamsulosin 0 4 mg Oral Daily With Dinner   pt/ot  Strain urine  Discharged 8/19      IP CONSULT TO CASE MANAGEMENT  IP CONSULT TO [address] [place] [address] Utilization Review Department  Phone: [phone]; Fax [phone]  [email or web address]  ATTENTION: Please call with any questions or concerns to [phone]  and carefully listen to the prompts so that you are directed to the right person  Send all requests for admission clinical reviews, approved or denied determinations and any other requests to fax [phone]   All voicemails are confidential

## 2019-08-19 NOTE — CONSULTS
[address] NOTE   Admission Date: 8/18/2019    Patient Identifiers: Guru Jones (MRN: [id number])  Service Requesting Consultation: Steve Rodriguez MD  Service Providing Consultation:  Urology, Robert Ramirez PA-C  Consults  Date of Service: 8/19/2019    Reason for Consultation:  Urology evaluation    History of Present Illness:     Guru Jones is a 60 y o  male with no prior urologic history presented with right-sided flank pain which he described as 10/10  He had no dysuria or hematuria no fever or chills  He did vomit x1  White count on admission was 10 26 and urine showed large microscopic blood  His CT scan showed a 4 mm stone in the right UV junction  Incidentally he was found to have an absent left kidney  He is now pain free  There were scattered punctate right renal calculi present  He denies any urologic family history as well  Past Medical, Past Surgical History:     Past Medical History:   Diagnosis Date    Arthritis of back     Scoliosis    :    History reviewed   No pertinent surgical history :    Medications, Allergies:     Current Facility-Administered Medications:     acetaminophen (TYLENOL) tablet 650 mg, 650 mg, Oral, Q6H PRN, Miko G Chirayath, DO    ciprofloxacin-dexamethasone (CIPRODEX) 0 3-0 1 % otic suspension 4 drop, 4 drop, Left Ear, BID, Miko G Chirayath, DO    gabapentin (NEURONTIN) capsule 300 mg, 300 mg, Oral, BID, Miko G Chirayath, DO    morphine injection 2 mg, 2 mg, Intravenous, Q4H PRN, Miko G Chirayath, DO    nicotine (NICODERM CQ) 14 mg/24hr TD 24 hr patch 1 patch, 1 patch, Transdermal, Daily, Miko G Chirayath, DO    tamsulosin (FLOMAX) capsule 0 4 mg, 0 4 mg, Oral, Daily With Dinner, Abbie Frazier MD    Allergies:  No Known Allergies:    Social and Family History:   Social History:   Social History     Tobacco Use    Smoking status: Current Every Day Smoker     Packs/day: 1 00     Types: Cigarettes    Smokeless tobacco: Never Used   Substance Use Topics    Alcohol use: Not Currently     Frequency: Never    Drug use: Never     Social History     Tobacco Use   Smoking Status Current Every Day Smoker    Packs/day: 1 00    Types: Cigarettes   Smokeless Tobacco Never Used       Family History:  Family History   Problem Relation Age of Onset   Learta January Cancer Mother    :     Review of Systems:     General: Fever, chills, or night sweats: negative  Cardiac: Negative for chest pain  Pulmonary: Negative for shortness of breath  Gastrointestinal: Abdominal pain negative  Nausea, vomiting, or diarrhea negative,  Genitourinary: See HPI above  Patient does not have hematuria  All other systems queried were negative  Physical Exam:   General: Patient is pleasant and in NAD  Awake and alert  /81   Pulse (!) 47   Temp 97 7 °F (36 5 °C)   Resp 17   Ht 6' 2" (1 88 m)   Wt 96 2 kg (212 lb)   SpO2 96%   BMI 27 22 kg/m²   Cardiac: Peripheral edema: negative  Pulmonary: Non-labored breathing  Abdomen: Soft, non-tender, non-distended  No surgical scars  No masses, tenderness, hernias noted  Genitourinary: Negative CVA tenderness, negative suprapubic tenderness  Labs:     Lab Results   Component Value Date    HGB 13 6 08/19/2019    HCT 42 8 08/19/2019    WBC 7 90 08/19/2019     08/19/2019   ]    Lab Results   Component Value Date    K 4 1 08/19/2019     (H) 08/19/2019    CO2 25 08/19/2019    BUN 19 08/19/2019    CREATININE 0 89 08/19/2019    CALCIUM 8 5 08/19/2019   ]    Imaging:   I personally reviewed the images and report of the following studies, and reviewed them with the patient:  CT ABDOMEN AND PELVIS WITHOUT IV CONTRAST      IMPRESSION:     4 mm obstructing calculus at the right ureterovesical junction with associated mild hydroureteronephrosis  Left kidney is absent  Hypodense splenic lesion is possibly a cyst but incompletely characterized    Further evaluation with ultrasound is suggested if clinically indicated      ASSESSMENT:   1  Right UV junction calculus     2  Solitary right kidney    PLAN:   -he is currently pain-free  -I ordered a repeat CT stone study with limited radiation  -if his stone has passed he may be discharged home with for outpatient follow-up  -if the stone remains he may be added on for ureteroscopy for later today  Thank you for allowing me to participate in this patients care  Please do not hesitate to call with any additional questions    Antoni Carr PA-C

## 2019-08-19 NOTE — OCCUPATIONAL THERAPY NOTE
Occupational Therapy         Patient Name: Zachary Heath  OCZWO'O Date: 8/19/2019      OT orders received and chart reviewed - pt tdc to home later today - no acute OT needs indicated at this time- d/c from caseload    Ashia Chandra

## 2019-08-19 NOTE — PLAN OF CARE
Problem: Potential for Falls  Goal: Patient will remain free of falls  Description  INTERVENTIONS:  - Assess patient frequently for physical needs  -  Identify cognitive and physical deficits and behaviors that affect risk of falls    -  [place] fall precautions as indicated by assessment   - Educate patient/family on patient safety including physical limitations  - Instruct patient to call for assistance with activity based on assessment  - Modify environment to reduce risk of injury  - Consider OT/PT consult to assist with strengthening/mobility  Outcome: Progressing     Problem: PAIN - ADULT  Goal: Verbalizes/displays adequate comfort level or baseline comfort level  Description  Interventions:  - Encourage patient to monitor pain and request assistance  - Assess pain using appropriate pain scale  - Administer analgesics based on type and severity of pain and evaluate response  - Implement non-pharmacological measures as appropriate and evaluate response  - Consider cultural and social influences on pain and pain management  - Notify physician/advanced practitioner if interventions unsuccessful or patient reports new pain  Outcome: Progressing     Problem: INFECTION - ADULT  Goal: Absence or prevention of progression during hospitalization  Description  INTERVENTIONS:  - Assess and monitor for signs and symptoms of infection  - Monitor lab/diagnostic results  - Monitor all insertion sites, i e  indwelling lines, tubes, and drains  - Administer medications as ordered  - Instruct and encourage patient and family to use good hand hygiene technique  - Identify and instruct in appropriate isolation precautions for identified infection/condition  Outcome: Progressing  Goal: Absence of fever/infection during neutropenic period  Description  INTERVENTIONS:  - Monitor WBC    Outcome: Progressing     Problem: SAFETY ADULT  Goal: Maintain or return to baseline ADL function  Description  INTERVENTIONS:  -  Assess patient's ability to carry out ADLs; assess patient's baseline for ADL function and identify physical deficits which impact ability to perform ADLs (bathing, care of mouth/teeth, toileting, grooming, dressing, etc )  - Assess/evaluate cause of self-care deficits   - Assess range of motion  - Assess patient's mobility; develop plan if impaired  - Assess patient's need for assistive devices and provide as appropriate  - Encourage maximum independence but intervene and supervise when necessary  - Assess for home care needs following discharge   - Provide patient education as appropriate  Outcome: Progressing  Goal: Maintain or return mobility status to optimal level  Description  INTERVENTIONS:  - Assess patient's baseline mobility status (ambulation, transfers, stairs, etc )    - Identify cognitive and physical deficits and behaviors that affect mobility  - Identify mobility aids required to assist with transfers and/or ambulation (gait belt, sit-to-stand, lift, walker, cane, etc )  - [place] fall precautions as indicated by assessment  - Record patient progress and toleration of activity level on Mobility SBAR; progress patient to next Phase/Stage  - Instruct patient to call for assistance with activity based on assessment  - Consider rehabilitation consult to assist with strengthening/weightbearing, etc   Outcome: Progressing     Problem: DISCHARGE PLANNING  Goal: Discharge to home or other facility with appropriate resources  Description  INTERVENTIONS:  - Identify barriers to discharge w/patient and caregiver  - Arrange for needed discharge resources and transportation as appropriate  - Identify discharge learning needs (meds, wound care, etc )  - Arrange for interpretive services to assist at discharge as needed  - Refer to Case Management Department for coordinating discharge planning if the patient needs post-hospital services based on physician/advanced practitioner order or complex needs related to functional status, cognitive ability, or social support system  Outcome: Progressing     Problem: Knowledge Deficit  Goal: Patient/family/caregiver demonstrates understanding of disease process, treatment plan, medications, and discharge instructions  Description  Complete learning assessment and assess knowledge base    Interventions:  - Provide teaching at level of understanding  - Provide teaching via preferred learning methods  Outcome: Progressing

## 2019-08-19 NOTE — DISCHARGE INSTRUCTIONS
Kidney Stones   WHAT YOU NEED TO KNOW:   Kidney stones form in the urinary system when the water and waste in your urine are out of balance  When this happens, certain types of waste crystals separate from the urine  The crystals build up and form kidney stones  You may have 1 or more kidney stones  DISCHARGE INSTRUCTIONS:   Return to the emergency department if:   · You have vomiting that is not relieved by medicine  Contact your healthcare provider if:   · You have a fever  · You have trouble passing urine  · You see blood in your urine  · You have severe pain  · You have any questions or concerns about your condition or care  Medicines:   · NSAIDs , such as ibuprofen, help decrease swelling, pain, and fever  This medicine is available with or without a doctor's order  NSAIDs can cause stomach bleeding or kidney problems in certain people  If you take blood thinner medicine, always ask your healthcare provider if NSAIDs are safe for you  Always read the medicine label and follow directions  · Prescription medicine  may be given  Ask how to take this medicine safely  · Medicines  to balance your electrolytes may be needed  · Take your medicine as directed  Contact your healthcare provider if you think your medicine is not helping or if you have side effects  Tell him or her if you are allergic to any medicine  Keep a list of the medicines, vitamins, and herbs you take  Include the amounts, and when and why you take them  Bring the list or the pill bottles to follow-up visits  Carry your medicine list with you in case of an emergency  Follow up with your healthcare provider as directed: You may need to return for more tests  Write down your questions so you remember to ask them during your visits  Self-care:   · Drink plenty of liquids  Your healthcare provider may tell you to drink at least 8 to 12 (eight-ounce) cups of liquids each day   This helps flush out the kidney stones when you urinate  Water is the best liquid to drink  · Strain your urine every time you go to the bathroom  Urinate through a strainer or a piece of thin cloth to catch the stones  Take the stones to your healthcare provider so they can be sent to the lab for tests  This will help your healthcare providers plan the best treatment for you  · Eat a variety of healthy foods  Healthy foods include fruits, vegetables, whole-grain breads, low-fat dairy products, beans, and fish  You may need to limit how much sodium (salt) or protein you eat  Ask for information about the best foods for you  · Stay active  Your stones may pass more easily by if you stay active  Ask about the best activities for you  After you pass your kidney stones:  Once you have passed your kidney stones, your healthcare provider may  order a 24-hour urine test  Results from a 24-hour urine test will help your healthcare provider plan ways to prevent more stones from forming  If you are told to do a 24-hour test, your healthcare provider will give you more instructions  © 2017 [facility] Information is for End User's use only and may not be sold, redistributed or otherwise used for commercial purposes  All illustrations and images included in CareNotes® are the copyrighted property of A D A M , Inc  or Alexi Senior  The above information is an  only  It is not intended as medical advice for individual conditions or treatments  Talk to your doctor, nurse or pharmacist before following any medical regimen to see if it is safe and effective for you

## 2019-08-20 NOTE — TELEPHONE ENCOUNTER
Patient has been discharged  Spoke with patient and scheduled for 9/17/19 at 2:45pm with Etienne Obregon at [facility] confirmed [facility] insurance and mailed appointment card, directions, communication consent, and aua to confirmed address

## 2019-08-26 LAB
CA PHOS MFR STONE: 5 %
COLOR STONE: NORMAL
COM MFR STONE: 95 %
COMMENT-STONE3: NORMAL
COMPOSITION: NORMAL
LABORATORY COMMENT REPORT: NORMAL
NIDUS STONE QL: NORMAL
PHOTO: NORMAL
SIZE STONE: NORMAL MM
STONE ANALYSIS-IMP: NORMAL
WT STONE: 15 MG

## 2019-09-17 ENCOUNTER — OFFICE VISIT (OUTPATIENT)
Dept: UROLOGY | Facility: CLINIC | Age: 59
End: 2019-09-17
Payer: COMMERCIAL

## 2019-09-17 VITALS
BODY MASS INDEX: 26.69 KG/M2 | SYSTOLIC BLOOD PRESSURE: 126 MMHG | HEART RATE: 87 BPM | DIASTOLIC BLOOD PRESSURE: 70 MMHG | WEIGHT: 208 LBS | HEIGHT: 74 IN

## 2019-09-17 DIAGNOSIS — N20.0 CALCULUS OF KIDNEY: Primary | ICD-10-CM

## 2019-09-17 PROCEDURE — 99213 OFFICE O/P EST LOW 20 MIN: CPT | Performed by: PHYSICIAN ASSISTANT

## 2019-09-17 NOTE — PROGRESS NOTES
UROLOGY PROGRESS NOTE   Patient Identifiers: Ellen Enriquez (MRN [id number])  Date of Service: 9/17/2019    Subjective:      25-year-old man history of kidney stones  I saw him on August 19th when he presented with right flank pain  He had no dysuria or hematuria no fever chills he had vomited x1  His original CAT scan showed a 4 mm right UVJ calculus  He was incidentally found to have an absent left kidney  When I saw him in the office he was pain free  A repeat CT scan showed the stone to be within the urinary bladder  Subsequently the stone was passed and shows to be 100% calcium  There were no other stones seen and he denies any other issues      Patient   Is here for follow-up of kidney stones    Objective:     VITALS:    Vitals:    09/17/19 1442   BP: 126/70   Pulse: 87           LABS:  Lab Results   Component Value Date    HGB 13 6 08/19/2019    HCT 42 8 08/19/2019    WBC 7 90 08/19/2019     08/19/2019   ]    Lab Results   Component Value Date    K 4 1 08/19/2019     (H) 08/19/2019    CO2 25 08/19/2019    BUN 19 08/19/2019    CREATININE 0 89 08/19/2019    CALCIUM 8 5 08/19/2019   ]        INPATIENT MEDS:    Current Outpatient Medications:     clotrimazole 1 % external solution, Apply 1 application topically 2 (two) times a day, Disp: 30 mL, Rfl: 0    gabapentin (NEURONTIN) 300 mg capsule, , Disp: , Rfl:     tamsulosin (FLOMAX) 0 4 mg, Take 1 capsule (0 4 mg total) by mouth daily with dinner, Disp: 30 capsule, Rfl: 0    acetaminophen (TYLENOL) 325 mg tablet, Take 2 tablets (650 mg total) by mouth every 6 (six) hours as needed for mild pain or moderate pain (Patient not taking: Reported on 9/17/2019), Disp: 30 tablet, Rfl: 0    ciprofloxacin-dexamethasone (CIPRODEX) otic suspension, Administer 4 drops into the left ear 2 (two) times a day (Patient not taking: Reported on 9/17/2019), Disp: 7 5 mL, Rfl: 0    ciprofloxacin-dexamethasone (CIPRODEX) otic suspension, Administer 4 drops into the left ear 2 (two) times a day (Patient not taking: Reported on 9/17/2019), Disp: 7 5 mL, Rfl: 0      Physical Exam:   /70 (BP Location: Left arm, Patient Position: Sitting, Cuff Size: Adult)   Pulse 87   Ht 6' 2" (1 88 m)   Wt 94 3 kg (208 lb)   BMI 26 71 kg/m²   GEN: no acute distress    RESP: breathing comfortably with no accessory muscle use    ABD: soft, non-tender, non-distended   INCISION:    EXT: no significant peripheral edema       RADIOLOGY:   CT ABDOMEN AND PELVIS WITHOUT IV CONTRAST - LOW DOSE RENAL STONE      IMPRESSION:     Interval passage of right UVJ calculus now seen within the dependent bladder      Assessment:    1   Right ureteral calculus now passed     Plan:   - he prefers to follow up on an as-needed basis  - we reviewed the stone analysis report  - he should call with any questions or concerns  -

## 2020-02-05 PROCEDURE — 87070 CULTURE OTHR SPECIMN AEROBIC: CPT | Performed by: OTOLARYNGOLOGY

## 2020-02-05 PROCEDURE — 87147 CULTURE TYPE IMMUNOLOGIC: CPT | Performed by: OTOLARYNGOLOGY

## 2020-02-05 PROCEDURE — 87205 SMEAR GRAM STAIN: CPT | Performed by: OTOLARYNGOLOGY

## 2021-01-01 NOTE — ED PROVIDER NOTES
History  Chief Complaint   Patient presents with    Back Pain     Pt presents to the ED via EMS  Pt has Hx of back problems and is supposed to have an MRI on wednesday but reports this pain is differnt than his daily back pain      HPI    Oswaldo Cm is a 60 y o  male presenting for evaluation of back pain  He reports that he had sudden onset of right paralumbar/flank pain around 1600 hrs this afternoon  He describes the pain as sharp and stabbing with radiation around to the RLQ and into the right groin  No significant exacerbating or alleviating factors  He took 500 mg naproxen without much relief  He also had some nausea with 1 episode of vomiting and lightheadedness, prompting EMS activation  The pain lasted approximately 1 5 hours and currently is significantly improved  He has had no further nausea or lightheadedness  He denies dysuria, hematuria, urinary frequency, abdominal pain, diarrhea, constipation, hematochezia, melena, bowel/bladder incontinence, saddle anesthesia, fever, chills, or any other complaints  He does have history of chronic back pain which he describes as sacral with radiation to the bilateral subcostal areas  This pain is similar but seems to be lower in the back  He has no history of prior nephrolithiasis  Prior to Admission Medications   Prescriptions Last Dose Informant Patient Reported? Taking?   ciprofloxacin-dexamethasone (CIPRODEX) otic suspension   No No   Sig: Administer 4 drops into the left ear 2 (two) times a day   ciprofloxacin-dexamethasone (CIPRODEX) otic suspension   No No   Sig: Administer 4 drops into the left ear 2 (two) times a day   clotrimazole 1 % external solution   No No   Sig: Apply 1 application topically 2 (two) times a day   gabapentin (NEURONTIN) 300 mg capsule   Yes No      Facility-Administered Medications: None       Past Medical History:   Diagnosis Date    Arthritis of back     Scoliosis        History reviewed   No pertinent surgical history  Family History   Problem Relation Age of Onset    Cancer Mother      I have reviewed and agree with the history as documented  Social History     Tobacco Use    Smoking status: Current Every Day Smoker     Packs/day: 1 00     Types: Cigarettes    Smokeless tobacco: Never Used   Substance Use Topics    Alcohol use: Not Currently     Frequency: Never    Drug use: Never        Review of Systems   Constitutional: Negative for chills, diaphoresis and fever  Respiratory: Negative for cough, chest tightness and shortness of breath  Cardiovascular: Negative for chest pain  Gastrointestinal: Positive for nausea and vomiting (x 1)  Negative for abdominal distention, abdominal pain, blood in stool, constipation and diarrhea  Genitourinary: Positive for flank pain  Negative for difficulty urinating, dysuria, frequency and urgency  Musculoskeletal: Positive for back pain  Negative for myalgias, neck pain and neck stiffness  Skin: Negative for color change, pallor and rash  Neurological: Positive for light-headedness  Negative for dizziness, syncope, weakness and numbness  Physical Exam  ED Triage Vitals [08/18/19 1734]   Temperature Pulse Respirations Blood Pressure SpO2   97 7 °F (36 5 °C) 67 19 155/73 96 %      Temp Source Heart Rate Source Patient Position - Orthostatic VS BP Location FiO2 (%)   Oral Monitor Lying Left arm --      Pain Score       5             Orthostatic Vital Signs  Vitals:    08/18/19 1900 08/18/19 1930 08/18/19 2030 08/18/19 2205   BP: 133/75 127/63 122/58 148/75   Pulse: 62 76 66 61   Patient Position - Orthostatic VS:   Lying        Physical Exam   Constitutional: He is oriented to person, place, and time  He appears well-developed and well-nourished  HENT:   Head: Normocephalic and atraumatic  Neck: Normal range of motion  Neck supple  No spinous process tenderness and no muscular tenderness present  Cardiovascular: Normal rate and regular rhythm     No murmur heard  Pulmonary/Chest: Effort normal and breath sounds normal    Abdominal: Soft  Bowel sounds are normal  He exhibits no distension and no pulsatile midline mass  There is no tenderness  There is no CVA tenderness  Musculoskeletal:        Cervical back: He exhibits normal range of motion, no tenderness, no bony tenderness and no deformity  Thoracic back: He exhibits no tenderness, no bony tenderness and no deformity  Lumbar back: He exhibits tenderness and pain (of the bilateral paraspinal musculature)  He exhibits no bony tenderness (no midline tenderness) and no deformity  Neurological: He is alert and oriented to person, place, and time  He has normal strength  No sensory deficit  5/5 strength with bilateral hip flexion, knee flexion/extension, plantar/dorsiflexion   Skin: Skin is warm, dry and intact  Nursing note and vitals reviewed        ED Medications  Medications   lidocaine (LIDODERM) 5 % patch 1 patch (1 patch Topical Medication Applied 8/18/19 1815)   multi-electrolyte (ISOLYTE-S PH 7 4 equivalent) IV solution (150 mL/hr Intravenous New Bag 8/18/19 2328)   nicotine (NICODERM CQ) 14 mg/24hr TD 24 hr patch 1 patch (has no administration in time range)   acetaminophen (TYLENOL) tablet 650 mg (has no administration in time range)   tamsulosin (FLOMAX) capsule 0 4 mg (has no administration in time range)   morphine injection 2 mg (has no administration in time range)   gabapentin (NEURONTIN) capsule 300 mg (has no administration in time range)   ciprofloxacin-dexamethasone (CIPRODEX) 0 3-0 1 % otic suspension 4 drop (has no administration in time range)   methocarbamol (ROBAXIN) tablet 500 mg (500 mg Oral Given 8/18/19 1814)   diazepam (VALIUM) injection 5 mg (5 mg Intravenous Given 8/18/19 1817)   sodium chloride 0 9 % bolus 1,000 mL (1,000 mL Intravenous New Bag 8/18/19 1932)       Diagnostic Studies  Results Reviewed     Procedure Component Value Units Date/Time    UA w Reflex to Microscopic w Reflex to Culture [[id number]]  (Abnormal) Resulted:  08/18/19 2207    Lab Status:  Final result Specimen:  Urine Updated:  08/18/19 2207     Color, UA Dk Yellow     Clarity, UA Cloudy     Specific Gravity, UA 1 013     pH, UA 6 5     Leukocytes, UA Small     Nitrite, UA Negative     Protein, UA Trace mg/dl      Glucose, UA Negative mg/dl      Ketones, UA Negative mg/dl      Urobilinogen, UA 0 2 E U /dl      Bilirubin, UA Negative     Blood, UA Large    POCT urinalysis dipstick [[id number]]  (Normal) Resulted:  08/18/19 2049    Lab Status:  Final result Specimen:  Urine Updated:  08/18/19 2049     Color, UA -    ED Urine Macroscopic [[id number]]  (Abnormal) Collected:  08/18/19 2048    Lab Status:  Final result Specimen:  Urine Updated:  08/18/19 2048     Color, UA Itzel     Clarity, UA Clear     pH, UA 6 5     Leukocytes, UA Small     Nitrite, UA Negative     Protein, UA 30 (1+) mg/dl      Glucose, UA Negative mg/dl      Ketones, UA Negative mg/dl      Urobilinogen, UA 0 2 E U /dl      Bilirubin, UA Negative     Blood, UA Large     Specific [place], UA 1 015    Narrative:       CLINITEK RESULT    Comprehensive metabolic panel [[id number]]  (Abnormal) Collected:  08/18/19 1809    Lab Status:  Final result Specimen:  Blood from Arm, Right Updated:  08/18/19 1838     Sodium 142 mmol/L      Potassium 4 7 mmol/L      Chloride 110 mmol/L      CO2 26 mmol/L      ANION GAP 6 mmol/L      BUN 22 mg/dL      Creatinine 1 14 mg/dL      Glucose 111 mg/dL      Calcium 9 4 mg/dL      AST 15 U/L      ALT 22 U/L      Alkaline Phosphatase 139 U/L      Total Protein 7 4 g/dL      Albumin 4 6 g/dL      Total Bilirubin 0 42 mg/dL      eGFR 70 ml/min/1 73sq m     Narrative:       [facility] guidelines for Chronic Kid[facility] Disease (CKD):     Stage 1 with normal or high GFR (GFR > 90 mL/min/1 73 square meters)    Stage 2 Mild CKD (GFR = 60-89 mL/min/1 73 square meters)    Stage 3A Moderate CKD (GFR = 45-59 mL/min/1 73 square meters)    Stage 3B Moderate CKD (GFR = 30-44 mL/min/1 73 square meters)    Stage 4 Severe CKD (GFR = 15-29 mL/min/1 73 square meters)    Stage 5 End Stage CKD (GFR <15 mL/min/1 73 square meters)  Note: GFR calculation is accurate only with a steady state creatinine    CBC and differential [[id number]]  (Abnormal) Collected:  08/18/19 1809    Lab Status:  Final result Specimen:  Blood from Arm, Right Updated:  08/18/19 1821     WBC 10 26 Thousand/uL      RBC 5 05 Million/uL      Hemoglobin 15 3 g/dL      Hematocrit 46 3 %      MCV 92 fL      MCH 30 3 pg      MCHC 33 0 g/dL      RDW 13 5 %      MPV 10 2 fL      Platelets 655 Thousands/uL      nRBC 0 /100 WBCs      Neutrophils Relative 77 %      Immat GRANS % 0 %      Lymphocytes Relative 14 %      Monocytes Relative 7 %      Eosinophils Relative 2 %      Basophils Relative 0 %      Neutrophils Absolute 7 91 Thousands/µL      Immature Grans Absolute 0 04 Thousand/uL      Lymphocytes Absolute 1 43 Thousands/µL      Monocytes Absolute 0 69 Thousand/µL      Eosinophils Absolute 0 15 Thousand/µL      Basophils Absolute 0 04 Thousands/µL                  CT abdomen pelvis wo contrast   Final Result by Rm George MD (08/18 1939)      4 mm obstructing calculus at the right ureterovesical junction with associated mild hydroureteronephrosis  Left kidney is absent  Hypodense splenic lesion is possibly a cyst but incompletely characterized  Further evaluation with ultrasound is suggested if clinically indicated            I personally discussed this study with Yudi Hu on 8/18/2019 at 7:39 PM                                   Workstation performed: [id number]               Procedures  Procedures        ED Course  ED Course as of Aug 19 0016   Sun Aug 18, 2019   1833 WBC(!): 10 26   1904 Creatinine: 1 14   2011 Urology Chiquita Goldsmith paged      0620 Leukocytes, UA(!): Small             MDM     This is a 57-year-old male who presents to the ED for evaluation of back pain  Concern for kidney stone, given history, as well as possible musculoskeletal etiology  Patient had labs checked here, as well as CT abdomen pelvis with contrast, looking for any acute stone versus abdominal pathology  Given pain control with Valium, given how patient had recent NSAID use just prior to arrival     CT scan shows that patient had a single right-sided kidney, with hydronephrosis, as well as an obstructing 4 millimeter stone  Patient has no bacteria seen on UA, will defer antibiotics at this time  Urology was consulted, they recommend the patient be admitted to Medicine and they will see the patient tomorrow  Disposition  Final diagnoses:   Nephrolithiasis   Single kidney   Acute right-sided low back pain without sciatica     Time reflects when diagnosis was documented in both MDM as applicable and the Disposition within this note     Time User Action Codes Description Comment    8/18/2019  8:31 PM Patricia Lei Add [N20 0] Nephrolithiasis     8/18/2019  8:31 PM Clayton Lei Add [Z90 5] Single kidney     8/18/2019  8:32 PM Clayton Lei Add [M54 5] Acute right-sided low back pain without sciatica     8/18/2019  9:18 PM [address] [place] [address], [address] [place] [N20 0] Nephrolithiasis       ED Disposition     ED Disposition Condition Date/Time Comment    Admit Stable Sun Aug 18, 2019  8:31 PM Case was discussed with SOD and the patient's admission status was agreed to be Admission Status: observation status to the service of Dr Laureano Baker          Follow-up Information    None         Current Discharge Medication List      CONTINUE these medications which have NOT CHANGED    Details   !! ciprofloxacin-dexamethasone (CIPRODEX) otic suspension Administer 4 drops into the left ear 2 (two) times a day  Qty: 7 5 mL, Refills: 0    Associated Diagnoses: Other infective chronic otitis externa of left ear      !! ciprofloxacin-dexamethasone (CIPRODEX) otic suspension Administer 4 drops into the left ear 2 (two) times a day  Qty: 7 5 mL, Refills: 0    Associated Diagnoses: Other infective chronic otitis externa of left ear      clotrimazole 1 % external solution Apply 1 application topically 2 (two) times a day  Qty: 30 mL, Refills: 0    Associated Diagnoses: Other infective chronic otitis externa of left ear      gabapentin (NEURONTIN) 300 mg capsule        !! - Potential duplicate medications found  Please discuss with provider  No discharge procedures on file  ED Provider  Attending physically available and evaluated Kinza Elizabeth I managed the patient along with the ED Attending      Electronically Signed by         Radha Sommers MD  08/19/19 4912 Passed

## 2021-03-10 PROCEDURE — 87186 SC STD MICRODIL/AGAR DIL: CPT | Performed by: PHYSICIAN ASSISTANT

## 2021-03-10 PROCEDURE — 87205 SMEAR GRAM STAIN: CPT | Performed by: PHYSICIAN ASSISTANT

## 2021-03-10 PROCEDURE — 87070 CULTURE OTHR SPECIMN AEROBIC: CPT | Performed by: PHYSICIAN ASSISTANT

## 2021-04-06 DIAGNOSIS — Z23 ENCOUNTER FOR IMMUNIZATION: ICD-10-CM

## 2021-04-14 ENCOUNTER — IMMUNIZATIONS (OUTPATIENT)
Dept: FAMILY MEDICINE CLINIC | Facility: HOSPITAL | Age: 61
End: 2021-04-14

## 2021-04-14 DIAGNOSIS — Z23 ENCOUNTER FOR IMMUNIZATION: Primary | ICD-10-CM

## 2021-04-14 PROCEDURE — 91300 SARS-COV-2 / COVID-19 MRNA VACCINE (PFIZER-BIONTECH) 30 MCG: CPT

## 2021-04-14 PROCEDURE — 0001A SARS-COV-2 / COVID-19 MRNA VACCINE (PFIZER-BIONTECH) 30 MCG: CPT

## 2021-04-20 PROCEDURE — 87102 FUNGUS ISOLATION CULTURE: CPT | Performed by: OTOLARYNGOLOGY

## 2021-04-20 PROCEDURE — 87070 CULTURE OTHR SPECIMN AEROBIC: CPT | Performed by: OTOLARYNGOLOGY

## 2021-04-20 PROCEDURE — 87205 SMEAR GRAM STAIN: CPT | Performed by: OTOLARYNGOLOGY

## 2021-04-20 PROCEDURE — 87186 SC STD MICRODIL/AGAR DIL: CPT | Performed by: OTOLARYNGOLOGY

## 2021-05-09 ENCOUNTER — IMMUNIZATIONS (OUTPATIENT)
Dept: FAMILY MEDICINE CLINIC | Facility: HOSPITAL | Age: 61
End: 2021-05-09

## 2021-05-09 DIAGNOSIS — Z23 ENCOUNTER FOR IMMUNIZATION: Primary | ICD-10-CM

## 2021-05-09 PROCEDURE — 0002A SARS-COV-2 / COVID-19 MRNA VACCINE (PFIZER-BIONTECH) 30 MCG: CPT

## 2021-05-09 PROCEDURE — 91300 SARS-COV-2 / COVID-19 MRNA VACCINE (PFIZER-BIONTECH) 30 MCG: CPT

## 2021-09-28 PROCEDURE — 87186 SC STD MICRODIL/AGAR DIL: CPT | Performed by: OTOLARYNGOLOGY

## 2021-09-28 PROCEDURE — 87205 SMEAR GRAM STAIN: CPT | Performed by: OTOLARYNGOLOGY

## 2021-09-28 PROCEDURE — 87102 FUNGUS ISOLATION CULTURE: CPT | Performed by: OTOLARYNGOLOGY

## 2021-09-28 PROCEDURE — 87070 CULTURE OTHR SPECIMN AEROBIC: CPT | Performed by: OTOLARYNGOLOGY

## 2021-11-06 ENCOUNTER — HOSPITAL ENCOUNTER (OUTPATIENT)
Dept: CT IMAGING | Facility: HOSPITAL | Age: 61
Discharge: HOME | End: 2021-11-06
Attending: OTOLARYNGOLOGY
Payer: COMMERCIAL

## 2021-11-06 DIAGNOSIS — Z90.89 HISTORY OF RIGHT MASTOIDECTOMY: ICD-10-CM

## 2021-11-06 DIAGNOSIS — H66.90 EAR INFECTION: ICD-10-CM

## 2021-11-06 PROCEDURE — 70480 CT ORBIT/EAR/FOSSA W/O DYE: CPT

## 2021-11-06 PROCEDURE — G1004 CDSM NDSC: HCPCS

## 2022-03-05 PROBLEM — H74.21: Status: ACTIVE | Noted: 2022-03-05

## 2022-03-05 PROBLEM — Z90.89 STATUS POST MASTOIDECTOMY: Status: ACTIVE | Noted: 2022-03-05

## 2022-03-05 PROBLEM — H90.A22 SENSORINEURAL HEARING LOSS (SNHL) OF LEFT EAR WITH RESTRICTED HEARING OF RIGHT EAR: Status: ACTIVE | Noted: 2022-03-05

## 2022-03-05 PROBLEM — H90.A31 MIXED CONDUCTIVE AND SENSORINEURAL HEARING LOSS OF RIGHT EAR WITH RESTRICTED HEARING OF LEFT EAR: Status: ACTIVE | Noted: 2022-03-05

## 2022-03-05 PROBLEM — H72.93 BILATERAL TYMPANIC MEMBRANE PERFORATION: Status: ACTIVE | Noted: 2022-03-05

## 2022-03-05 PROBLEM — H66.21 CHRONIC ATTICOANTRAL SUPPURATIVE OTITIS MEDIA OF RIGHT EAR: Status: ACTIVE | Noted: 2022-03-05

## 2022-06-04 ENCOUNTER — OFFICE VISIT (OUTPATIENT)
Dept: LAB | Facility: CLINIC | Age: 62
End: 2022-06-04
Payer: COMMERCIAL

## 2022-06-04 ENCOUNTER — APPOINTMENT (OUTPATIENT)
Dept: LAB | Facility: CLINIC | Age: 62
End: 2022-06-04
Payer: COMMERCIAL

## 2022-06-04 DIAGNOSIS — H91.91 HEARING LOSS OF RIGHT EAR, UNSPECIFIED HEARING LOSS TYPE: ICD-10-CM

## 2022-06-04 DIAGNOSIS — H66.3X1 CHRONIC SUPPURATIVE OTITIS MEDIA OF RIGHT EAR, UNSPECIFIED OTITIS MEDIA LOCATION: ICD-10-CM

## 2022-06-04 LAB
ANION GAP SERPL CALCULATED.3IONS-SCNC: 5 MMOL/L (ref 4–13)
BASOPHILS # BLD AUTO: 0.07 THOUSANDS/ΜL (ref 0–0.1)
BASOPHILS NFR BLD AUTO: 1 % (ref 0–1)
BUN SERPL-MCNC: 17 MG/DL (ref 5–25)
CALCIUM SERPL-MCNC: 9.2 MG/DL (ref 8.4–10.2)
CHLORIDE SERPL-SCNC: 109 MMOL/L (ref 96–108)
CO2 SERPL-SCNC: 27 MMOL/L (ref 21–32)
CREAT SERPL-MCNC: 0.95 MG/DL (ref 0.6–1.3)
EOSINOPHIL # BLD AUTO: 0.44 THOUSAND/ΜL (ref 0–0.61)
EOSINOPHIL NFR BLD AUTO: 5 % (ref 0–6)
ERYTHROCYTE [DISTWIDTH] IN BLOOD BY AUTOMATED COUNT: 13.3 % (ref 11.6–15.1)
GFR SERPL CREATININE-BSD FRML MDRD: 85 ML/MIN/1.73SQ M
GLUCOSE P FAST SERPL-MCNC: 107 MG/DL (ref 65–99)
HCT VFR BLD AUTO: 48.1 % (ref 36.5–49.3)
HGB BLD-MCNC: 15.2 G/DL (ref 12–17)
IMM GRANULOCYTES # BLD AUTO: 0.03 THOUSAND/UL (ref 0–0.2)
IMM GRANULOCYTES NFR BLD AUTO: 0 % (ref 0–2)
LYMPHOCYTES # BLD AUTO: 1.79 THOUSANDS/ΜL (ref 0.6–4.47)
LYMPHOCYTES NFR BLD AUTO: 20 % (ref 14–44)
MCH RBC QN AUTO: 29.3 PG (ref 26.8–34.3)
MCHC RBC AUTO-ENTMCNC: 31.6 G/DL (ref 31.4–37.4)
MCV RBC AUTO: 93 FL (ref 82–98)
MONOCYTES # BLD AUTO: 0.6 THOUSAND/ΜL (ref 0.17–1.22)
MONOCYTES NFR BLD AUTO: 7 % (ref 4–12)
NEUTROPHILS # BLD AUTO: 6.01 THOUSANDS/ΜL (ref 1.85–7.62)
NEUTS SEG NFR BLD AUTO: 67 % (ref 43–75)
NRBC BLD AUTO-RTO: 0 /100 WBCS
PLATELET # BLD AUTO: 288 THOUSANDS/UL (ref 149–390)
PMV BLD AUTO: 10.2 FL (ref 8.9–12.7)
POTASSIUM SERPL-SCNC: 4 MMOL/L (ref 3.5–5.3)
RBC # BLD AUTO: 5.19 MILLION/UL (ref 3.88–5.62)
SODIUM SERPL-SCNC: 141 MMOL/L (ref 135–147)
WBC # BLD AUTO: 8.94 THOUSAND/UL (ref 4.31–10.16)

## 2022-06-04 PROCEDURE — 36415 COLL VENOUS BLD VENIPUNCTURE: CPT

## 2022-06-04 PROCEDURE — 85025 COMPLETE CBC W/AUTO DIFF WBC: CPT

## 2022-06-04 PROCEDURE — 93005 ELECTROCARDIOGRAM TRACING: CPT

## 2022-06-04 PROCEDURE — 80048 BASIC METABOLIC PNL TOTAL CA: CPT

## 2022-06-06 LAB
ATRIAL RATE: 61 BPM
P AXIS: 67 DEGREES
PR INTERVAL: 158 MS
QRS AXIS: 9 DEGREES
QRSD INTERVAL: 84 MS
QT INTERVAL: 422 MS
QTC INTERVAL: 424 MS
T WAVE AXIS: 34 DEGREES
VENTRICULAR RATE: 61 BPM

## 2022-06-06 PROCEDURE — 93010 ELECTROCARDIOGRAM REPORT: CPT | Performed by: INTERNAL MEDICINE

## 2022-06-06 NOTE — PRE-PROCEDURE INSTRUCTIONS
Pre-Surgery Instructions:   Medication Instructions    cephalexin (KEFLEX) 500 mg capsule Take day of surgery  Reviewed with patient, in detail, instructions from "My Surgical Experience"  Instructed to avoid all  OTC vitamins/supplements and NSAIDS for one week prior to surgery per anesthesia guidelines  Tylenol ok to take PRN  Advised patient that Ulices Chung will call with surgery arrival time and hospital directions the business day prior to surgery  Advised patient nothing eat or drink after midnight prior to surgery  Instructed to call surgeon's office in meantime with any new illnesses/exposure  Patient verbalized understanding of current visitor restrictions/masking guidelines and advised that he/she can confirm these at time of arrival call with Ulices Chung  Patient verbalized understanding and knows to call surgeon's office with any additional questions prior to surgery

## 2022-06-13 ENCOUNTER — ANESTHESIA (OUTPATIENT)
Dept: PERIOP | Facility: HOSPITAL | Age: 62
End: 2022-06-13
Payer: COMMERCIAL

## 2022-06-13 ENCOUNTER — HOSPITAL ENCOUNTER (OUTPATIENT)
Facility: HOSPITAL | Age: 62
Setting detail: OUTPATIENT SURGERY
Discharge: HOME | End: 2022-06-13
Attending: OTOLARYNGOLOGY | Admitting: OTOLARYNGOLOGY
Payer: COMMERCIAL

## 2022-06-13 ENCOUNTER — ANESTHESIA EVENT (OUTPATIENT)
Dept: PERIOP | Facility: HOSPITAL | Age: 62
End: 2022-06-13
Payer: COMMERCIAL

## 2022-06-13 VITALS
DIASTOLIC BLOOD PRESSURE: 87 MMHG | WEIGHT: 212.08 LBS | TEMPERATURE: 97.5 F | BODY MASS INDEX: 27.22 KG/M2 | RESPIRATION RATE: 13 BRPM | HEIGHT: 74 IN | OXYGEN SATURATION: 95 % | HEART RATE: 85 BPM | SYSTOLIC BLOOD PRESSURE: 166 MMHG

## 2022-06-13 DIAGNOSIS — H66.21 CHRONIC ATTICOANTRAL SUPPURATIVE OTITIS MEDIA OF RIGHT EAR: ICD-10-CM

## 2022-06-13 DIAGNOSIS — H66.3X1 CHRONIC SUPPURATIVE OTITIS MEDIA OF RIGHT EAR, UNSPECIFIED OTITIS MEDIA LOCATION: ICD-10-CM

## 2022-06-13 DIAGNOSIS — Z98.890 PONV (POSTOPERATIVE NAUSEA AND VOMITING): Primary | ICD-10-CM

## 2022-06-13 DIAGNOSIS — H91.91 HEARING LOSS OF RIGHT EAR, UNSPECIFIED HEARING LOSS TYPE: ICD-10-CM

## 2022-06-13 DIAGNOSIS — R11.2 PONV (POSTOPERATIVE NAUSEA AND VOMITING): Primary | ICD-10-CM

## 2022-06-13 PROCEDURE — L8613 OSSICULAR IMPLANT: HCPCS | Performed by: OTOLARYNGOLOGY

## 2022-06-13 PROCEDURE — 69642 REVISE MIDDLE EAR & MASTOID: CPT | Performed by: OTOLARYNGOLOGY

## 2022-06-13 PROCEDURE — 88304 TISSUE EXAM BY PATHOLOGIST: CPT | Performed by: PATHOLOGY

## 2022-06-13 DEVICE — HA FRISBEE PARTIAL 1.50MM LENGTH HA
Type: IMPLANTABLE DEVICE | Site: EAR | Status: FUNCTIONAL
Brand: HA FRISBEE PARTIAL

## 2022-06-13 RX ORDER — TRAMADOL HYDROCHLORIDE 50 MG/1
50 TABLET ORAL EVERY 6 HOURS PRN
Status: DISCONTINUED | OUTPATIENT
Start: 2022-06-13 | End: 2022-06-13

## 2022-06-13 RX ORDER — ONDANSETRON 2 MG/ML
INJECTION INTRAMUSCULAR; INTRAVENOUS AS NEEDED
Status: DISCONTINUED | OUTPATIENT
Start: 2022-06-13 | End: 2022-06-13

## 2022-06-13 RX ORDER — ACETAMINOPHEN 325 MG/1
650 TABLET ORAL EVERY 6 HOURS PRN
Status: DISCONTINUED | OUTPATIENT
Start: 2022-06-13 | End: 2022-06-13 | Stop reason: HOSPADM

## 2022-06-13 RX ORDER — IBUPROFEN 200 MG
200 TABLET ORAL EVERY 6 HOURS PRN
Status: DISCONTINUED | OUTPATIENT
Start: 2022-06-13 | End: 2022-06-13 | Stop reason: HOSPADM

## 2022-06-13 RX ORDER — PROPOFOL 10 MG/ML
INJECTION, EMULSION INTRAVENOUS AS NEEDED
Status: DISCONTINUED | OUTPATIENT
Start: 2022-06-13 | End: 2022-06-13

## 2022-06-13 RX ORDER — SODIUM CHLORIDE, SODIUM LACTATE, POTASSIUM CHLORIDE, CALCIUM CHLORIDE 600; 310; 30; 20 MG/100ML; MG/100ML; MG/100ML; MG/100ML
125 INJECTION, SOLUTION INTRAVENOUS CONTINUOUS
Status: DISCONTINUED | OUTPATIENT
Start: 2022-06-13 | End: 2022-06-13 | Stop reason: HOSPADM

## 2022-06-13 RX ORDER — CEFAZOLIN SODIUM 1 G/50ML
1000 SOLUTION INTRAVENOUS ONCE
Status: COMPLETED | OUTPATIENT
Start: 2022-06-13 | End: 2022-06-13

## 2022-06-13 RX ORDER — EPINEPHRINE 1 MG/ML
INJECTION, SOLUTION, CONCENTRATE INTRAVENOUS AS NEEDED
Status: DISCONTINUED | OUTPATIENT
Start: 2022-06-13 | End: 2022-06-13 | Stop reason: HOSPADM

## 2022-06-13 RX ORDER — GINSENG 100 MG
CAPSULE ORAL AS NEEDED
Status: DISCONTINUED | OUTPATIENT
Start: 2022-06-13 | End: 2022-06-13 | Stop reason: HOSPADM

## 2022-06-13 RX ORDER — LIDOCAINE HYDROCHLORIDE 10 MG/ML
INJECTION, SOLUTION EPIDURAL; INFILTRATION; INTRACAUDAL; PERINEURAL AS NEEDED
Status: DISCONTINUED | OUTPATIENT
Start: 2022-06-13 | End: 2022-06-13

## 2022-06-13 RX ORDER — DEXAMETHASONE SODIUM PHOSPHATE 10 MG/ML
INJECTION, SOLUTION INTRAMUSCULAR; INTRAVENOUS AS NEEDED
Status: DISCONTINUED | OUTPATIENT
Start: 2022-06-13 | End: 2022-06-13

## 2022-06-13 RX ORDER — ONDANSETRON 2 MG/ML
4 INJECTION INTRAMUSCULAR; INTRAVENOUS ONCE AS NEEDED
Status: DISCONTINUED | OUTPATIENT
Start: 2022-06-13 | End: 2022-06-13 | Stop reason: HOSPADM

## 2022-06-13 RX ORDER — ONDANSETRON 2 MG/ML
4 INJECTION INTRAMUSCULAR; INTRAVENOUS EVERY 6 HOURS PRN
Status: DISCONTINUED | OUTPATIENT
Start: 2022-06-13 | End: 2022-06-13 | Stop reason: HOSPADM

## 2022-06-13 RX ORDER — MIDAZOLAM HYDROCHLORIDE 2 MG/2ML
INJECTION, SOLUTION INTRAMUSCULAR; INTRAVENOUS AS NEEDED
Status: DISCONTINUED | OUTPATIENT
Start: 2022-06-13 | End: 2022-06-13

## 2022-06-13 RX ORDER — AMOXICILLIN 500 MG/1
500 CAPSULE ORAL EVERY 8 HOURS SCHEDULED
Qty: 15 CAPSULE | Refills: 0 | Status: SHIPPED | OUTPATIENT
Start: 2022-06-13 | End: 2022-06-18

## 2022-06-13 RX ORDER — FENTANYL CITRATE/PF 50 MCG/ML
50 SYRINGE (ML) INJECTION
Status: DISCONTINUED | OUTPATIENT
Start: 2022-06-13 | End: 2022-06-13 | Stop reason: HOSPADM

## 2022-06-13 RX ORDER — PHENYLEPHRINE HYDROCHLORIDE 10 MG/ML
INJECTION INTRAVENOUS AS NEEDED
Status: DISCONTINUED | OUTPATIENT
Start: 2022-06-13 | End: 2022-06-13

## 2022-06-13 RX ORDER — FENTANYL CITRATE 50 UG/ML
INJECTION, SOLUTION INTRAMUSCULAR; INTRAVENOUS AS NEEDED
Status: DISCONTINUED | OUTPATIENT
Start: 2022-06-13 | End: 2022-06-13

## 2022-06-13 RX ORDER — SUCCINYLCHOLINE/SOD CL,ISO/PF 100 MG/5ML
SYRINGE (ML) INTRAVENOUS AS NEEDED
Status: DISCONTINUED | OUTPATIENT
Start: 2022-06-13 | End: 2022-06-13

## 2022-06-13 RX ORDER — ONDANSETRON 4 MG/1
4 TABLET, FILM COATED ORAL EVERY 8 HOURS PRN
Qty: 20 TABLET | Refills: 0 | Status: SHIPPED | OUTPATIENT
Start: 2022-06-13

## 2022-06-13 RX ORDER — HYDROMORPHONE HCL/PF 1 MG/ML
0.5 SYRINGE (ML) INJECTION
Status: DISCONTINUED | OUTPATIENT
Start: 2022-06-13 | End: 2022-06-13 | Stop reason: HOSPADM

## 2022-06-13 RX ORDER — SODIUM CHLORIDE, SODIUM LACTATE, POTASSIUM CHLORIDE, CALCIUM CHLORIDE 600; 310; 30; 20 MG/100ML; MG/100ML; MG/100ML; MG/100ML
INJECTION, SOLUTION INTRAVENOUS CONTINUOUS PRN
Status: DISCONTINUED | OUTPATIENT
Start: 2022-06-13 | End: 2022-06-13

## 2022-06-13 RX ORDER — OFLOXACIN 3 MG/ML
SOLUTION/ DROPS OPHTHALMIC AS NEEDED
Status: DISCONTINUED | OUTPATIENT
Start: 2022-06-13 | End: 2022-06-13 | Stop reason: HOSPADM

## 2022-06-13 RX ADMIN — ONDANSETRON 4 MG: 2 INJECTION INTRAMUSCULAR; INTRAVENOUS at 14:44

## 2022-06-13 RX ADMIN — MIDAZOLAM 2 MG: 1 INJECTION INTRAMUSCULAR; INTRAVENOUS at 11:51

## 2022-06-13 RX ADMIN — PHENYLEPHRINE HYDROCHLORIDE 100 MCG: 10 INJECTION INTRAVENOUS at 12:40

## 2022-06-13 RX ADMIN — Medication 50 MCG: at 16:20

## 2022-06-13 RX ADMIN — DEXAMETHASONE SODIUM PHOSPHATE 10 MG: 10 INJECTION, SOLUTION INTRAMUSCULAR; INTRAVENOUS at 11:59

## 2022-06-13 RX ADMIN — FENTANYL CITRATE 50 MCG: 0.05 INJECTION, SOLUTION INTRAMUSCULAR; INTRAVENOUS at 11:59

## 2022-06-13 RX ADMIN — PROPOFOL 150 MG: 10 INJECTION, EMULSION INTRAVENOUS at 11:59

## 2022-06-13 RX ADMIN — SODIUM CHLORIDE, SODIUM LACTATE, POTASSIUM CHLORIDE, AND CALCIUM CHLORIDE 125 ML/HR: .6; .31; .03; .02 INJECTION, SOLUTION INTRAVENOUS at 10:45

## 2022-06-13 RX ADMIN — Medication 50 MCG: at 15:38

## 2022-06-13 RX ADMIN — REMIFENTANIL HYDROCHLORIDE 0.2 MCG/KG/MIN: 1 INJECTION, POWDER, LYOPHILIZED, FOR SOLUTION INTRAVENOUS at 12:05

## 2022-06-13 RX ADMIN — FENTANYL CITRATE 50 MCG: 0.05 INJECTION, SOLUTION INTRAMUSCULAR; INTRAVENOUS at 12:10

## 2022-06-13 RX ADMIN — ACETAMINOPHEN 650 MG: 325 TABLET, FILM COATED ORAL at 17:25

## 2022-06-13 RX ADMIN — LIDOCAINE HYDROCHLORIDE 50 MG: 10 INJECTION, SOLUTION EPIDURAL; INFILTRATION; INTRACAUDAL; PERINEURAL at 11:59

## 2022-06-13 RX ADMIN — SODIUM CHLORIDE, SODIUM LACTATE, POTASSIUM CHLORIDE, AND CALCIUM CHLORIDE: .6; .31; .03; .02 INJECTION, SOLUTION INTRAVENOUS at 11:51

## 2022-06-13 RX ADMIN — Medication 100 MG: at 11:59

## 2022-06-13 RX ADMIN — PROPOFOL 50 MG: 10 INJECTION, EMULSION INTRAVENOUS at 12:10

## 2022-06-13 RX ADMIN — CEFAZOLIN SODIUM 2000 MG: 1 SOLUTION INTRAVENOUS at 12:15

## 2022-06-13 NOTE — ANESTHESIA PREPROCEDURE EVALUATION
Procedure:  RIGHT REVISION CANAL WALL DOWN TYMPANOMASTOIDECTOMY WITH OSSICULOPLASTY AND FACIAL NERVE MONITORING (Right Ear)    Relevant Problems   /RENAL   (+) Nephrolithiasis   (+) Single kidney      MUSCULOSKELETAL   (+) Chronic back pain      NEURO/PSYCH   (+) Chronic back pain      Nervous and Auditory   (+) Chronic atticoantral suppurative otitis media of right ear   (+) Ossicular chain disarticulation, right   (+) Sensorineural hearing loss (SNHL) of left ear with restricted hearing of right ear      Other   (+) Status post mastoidectomy   (+) Tobacco abuse        Physical Exam    Airway    Mallampati score: II  TM Distance: >3 FB  Neck ROM: full     Dental   lower dentures,     Cardiovascular      Pulmonary      Other Findings        Anesthesia Plan  ASA Score- 2     Anesthesia Type- general with ASA Monitors  Additional Monitors:   Airway Plan: ETT  Plan Factors-Exercise tolerance (METS): >4 METS  Chart reviewed  Existing labs reviewed  Patient summary reviewed  Patient is a current smoker  Patient instructed to abstain from smoking on day of procedure  Patient smoked on day of surgery  Induction- intravenous  Postoperative Plan- Plan for postoperative opioid use  Planned trial extubation    Informed Consent- Anesthetic plan and risks discussed with patient  I personally reviewed this patient with the CRNA  Discussed and agreed on the Anesthesia Plan with the CRNA  Guru Pearl

## 2022-06-13 NOTE — DISCHARGE INSTRUCTIONS
Discharge to home    Remove dressing tomorrow morning  Keep ear dry  OK to wash hair in 3 days, keep ear dry  No lifting > 20 b for 3 weeks  Continue home medications  Tylenol 650 mg PO Q6 hours as needed for pain  Ibuprofen 200 mg PO q4 hour as needed for pain  Zofran 4 mg PO q6 hours as needed for nausea  Amoxicillin 500 mg PO three times a day for 5 days  Diet as tolerated, ok to eat regular foods  Follow up in 1 week with Dr Varun Villanueva office

## 2022-06-13 NOTE — ANESTHESIA POSTPROCEDURE EVALUATION
Post-Op Assessment Note    CV Status:  Stable  Pain Score: 3    Pain management: satisfactory to patient     Mental Status:  Alert and awake   Hydration Status:  Euvolemic   PONV Controlled:  Controlled   Airway Patency:  Patent      Post Op Vitals Reviewed: Yes      Staff: CRNA         No complications documented      BP   169/75   Temp  97 3   Pulse  65   Resp   19   SpO2   97

## 2022-06-13 NOTE — OP NOTE
OPERATIVE REPORT  PATIENT NAME: Sabrina Decker    :  1960  MRN: [id number]  Pt Location: [facility] OR ROOM 06    SURGERY DATE: 2022    Surgeon(s) and Role:     * Ashely Lizarraga MD - Primary    Preop Diagnosis:  Chronic suppurative otitis media of right ear, unspecified otitis media location [H66 3X1]  Hearing loss of right ear, unspecified hearing loss type [H91 91]    Post-Op Diagnosis Codes:     * Chronic suppurative otitis media of right ear, unspecified otitis media location [H66 3X1]     * Hearing loss of right ear, unspecified hearing loss type [H91 91]    Procedure(s) (LRB):  RIGHT REVISION CANAL WALL DOWN TYMPANOMASTOIDECTOMY WITH OSSICULOPLASTY AND FACIAL NERVE MONITORING (Right)    Specimen(s):  ID Type Source Tests Collected by Time Destination   1 : middle ear and mastoid contents right ear Tissue Ear, Right TISSUE EXAM Ashely Lizarraga MD 2022  1:35 PM        Estimated Blood Loss:   Minimal    Drains:  * No LDAs found * none    Anesthesia Type:   General    Operative Indications:  Chronic suppurative otitis media of right ear, atticoantral  Right mixed conductive and sensorineural hearing loss  Recurrent mastoid bowl infections    Operative Findings:  Inadequate meatoplasty  Lateralized tympanic membrane  High facial ridge  High edges of prior mastoid cavity  Unopened mastoid air cells  Granulation tissue in the mastoid bowl and middle ear  Absent incus  Intact stapes superstructure covered with tympanosclerosis and granulation tissue  Present chorda tympani nerve    Complications:   None    Procedure and Technique:    The patient was brought into the operating room and placed supine on the OR table  Following the induction of general anesthesia, an endotracheal tube was placed by the anesthesia staff  The bed was turned 180 degrees  A cotton ball was placed in the right ear   A 5 cm C-shaped incision was marked out 0 5 cm behind the right ear and injected with 1% lidocaine, 1:100,000 epinephrine  A facial nerve monitor was placed on the right face was found to be functional  The patient was then prepped and draped in sterile fashion  The marked postauricular incision was made through the skin, and the skin flap was elevated anteriorly  A superiorly based Palva flap was elevated  A piece of temporalis fascia was harvested and saved on the back table for later use  The operating microscope was steriley draped, brought into the field, and used for the remainder of the procedure  Next, the external auditory canal was inspected using the operating microscope  The following findings were noted: the meatoplasty was inadequate  There was lateralization of the tympanic membrane with an anterior inferior dry perforation (10%)  The ear canal was injected in 4 quadrants with a 1:100,000 dilution of epinephrine in sterile injectable saline  The conchal bowl was also injected with diluted epinephrine  From posteriorly, the skin surrounding the external auditory canal was elevated superiorly and inferiorly  Next, the skin of the external auditory canal was elevated medially  Incisions for a Colton's flap were made, and the skin was elevated, allowing full visualization of the tympanic membrane and medial portion of the external auditory canal        Next the meatoplasty was performed  Incisions were made at 12 and 6 o'clock through the skin of the external auditory canal laterally  These incisions were carried laterally into the conchal bowl immediately beneath the antihelix and immediately superior to the helical rim inferiorly, and were taken through the underlying soft tissues and cartilage of the conchal bowl  The skin flap was dissected off the underlying cartilage and the cartilage of that area of the conchal bowl was removed  The underlying soft tissue was also opened to allow for an adequate meatoplasty      A  revision canal wall down mastoidectomy was then performed using the high-speed otologic drill and microdissection techniques  During this portion of the procedure, the following landmarks were identified and preserved: the posterior bony canal wall, the sigmoid sinus, the tegmen mastoideum, the horizontal semicircular canal  The following findings were identified: the mastoid bowl edges were not saucerized previously and were overhanging  There was extensive granulation tissue in the mastoid, which was removed and sent for pathology  There were unopened mastoid air cells  The facial ridge was high  The mastoid was thoroughly drilled and the edges of the mastoidectomy were saucerized  The tympanic membrane was elevated anterior and the facial ridge was drilled down, leaving a thin layer of bone over the facial nerve  There was no evidence of cholesteatoma  A canalplasty was performed at this time  The skin overlying the superior portion of the external auditory canal was elevated, and the skin flap was protected using a drill shield  Using the high speed otologic drill and microdissection techniques, the bone of the superior overhanging portion of the canal was removed  Following removal of this bone, the entire tympanic membrane and the epitympanum could be visualized  The tympanoplasty was then performed as follows  The tympanomeatal flap was elevated using a duckbill elevator  There was extensive tympanosclerosis and granulation tissue beneath the tympanic membrane in the posterior superior and posterior inferior quadrants  The chorda tympani was present and was cut with Bellucci scissors and removed  The ossicles were not intact  The malleus and incus were absent  The tensor tympani tendon was present and adherent to the tympanic membrane  The tensor tympani tendon was cut with Bellucci scissors and removed  The stapes superstructure was thoroughly enmeshed in tympanosclerosis and was dissected free using sharp dissection and microdissection techniques   After removal of the enmeshing tympanosclerosis, the stapes was intact and mobile to light palpation, yielding a round window reflex  The tympanic segment of the facial nerve was bony covered  The tympanosclerosis in the middle ear cavity was removed in its entirety  There was no evidence of cholesteatoma  Most of the lateralized tympanic membrane was removed  The skin edges of the tympanic membrane perforation were freshened  The tympanoplasty was then completed  Floxin-soaked gelfoam was placed into the middle ear cavity  The temporalis fascia was placed in an underlay fashion  The ossiculoplasty was then performed as follows: A piece of cartilage that had been removed during the meatoplasty was trimmed to cover a PORP  A 1 5 mm W STACY  [facility] PORP was put into position on the stapes capitulum  It was held into position with Floxin-soaked gelfoam  The cartilage cap was then placed over the PORP  The tympanomeatal flap and temporalis fascia graft were then put back into position  Floxin-soaked gelfoam was then placed over the tympanomeatal flap and temporalis fascia graft   The Colton's flap was then put back into position  The Palva flap was sutured back into position with interrupted sutures of 3-0 vicryl  The mastoid bowl was packed with bacitracin-soaked 1/4 strip packing gauze  A bacitracin-soaked Telfa plug was then placed into the meatoplasty  The postauricular incision was closed in layers  Tissue glue adhesive was then placed over the incision line  The sterile drapes were removed from the head  A Brenden dressing was placed  He was then awoken from general anesthesia and taken to the PACU in stable condition  His postoperative facial function was I/VI on the House-Brackmann scale bilaterally  The patient tolerated this procedure well without complications       Dr Bethanie Fu was present throughout this procedure and performed all key portions     I was present for the entire procedure    Patient Disposition:  PACU  and extubated and stable      SIGNATURE: Meg Shannon MD  DATE: June 13, 2022  TIME: 2:54 PM

## 2022-06-13 NOTE — H&P
Jake Barriga is a 57 y o  who presents with a chief complaint of       Chief Complaint   Patient presents with    Ear Problem         Referred by Dr Richard Russo     Pertinent elements of the history include:  57year old man with a lifelong history of "ear problems" and status post right canal wall down tympanomastoidectomy many years ago and left canal wall up mastoidectomy, right mixed hearing loss with tympanic membrane perforation and left sensorineural hearing loss  He reports that his right ear has been draining on and off for the past 4-6 years  It will occasionally improve on antibiotics but later will subsequently worsen again once he is off antibiotics  He reports that currently the otorrhea has resolved from the right ear and it is "the best it has been in years" after recently completing a course of oral antibiotics prescribed by Dr Richard Russo, however, he is convinced it will start again  He feels that his hearing has improved since his otorrhea has resolved  He has complaints of occasional right nonpulsatile tinnitus  He has episodic vertigo and imbalance       Review of systems Review of systems reviewed, as documented on a separate form  All other systems reviewed, are negative  Dr Ricahr Cantrell has reviewed these with the patient      The past medical, surgical, social and family history have been reviewed as documented in today's record       has a past medical history of Arthritis of back and Scoliosis      History reviewed   No pertinent surgical history            Family History   Problem Relation Age of Onset    Cancer Mother           Social History      Socioeconomic History    Marital status: /Civil Union       Spouse name: Not on file    Number of children: Not on file    Years of education: Not on file    Highest education level: Not on file   Occupational History    Not on file   Tobacco Use    Smoking status: Current Every Day Smoker       Packs/day: 1 00       Types: Cigarettes    Smokeless tobacco: Never Used   Vaping Use    Vaping Use: Never used   Substance and Sexual Activity    Alcohol use: Not Currently    Drug use: Never    Sexual activity: Not on file   Other Topics Concern    Not on file   Social History Narrative    Not on file      Social Determinants of Health      Financial Resource Strain: Not on file   Food Insecurity: Not on file   Transportation Needs: Not on file   Physical Activity: Not on file   Stress: Not on file   Social Connections: Not on file   Intimate Partner Violence: Not on file   Housing Stability: Not on file                Current Outpatient Medications on File Prior to Visit   Medication Sig Dispense Refill    ciprofloxacin-dexamethasone (CIPRODEX) otic suspension Administer 4 drops into both ears 2 (two) times a day 7 5 mL 1    clotrimazole 1 % external solution Apply 1 application topically 2 (two) times a day 30 mL 0    dexamethasone (MAXIDEX) 0 1 % ophthalmic suspension Administer 2 drops into the left eye 2 (two) times a day 15 mL 0    fluticasone (FLONASE) 50 mcg/act nasal spray 2 sprays into each nostril daily 1 Bottle 6    fluticasone (FLONASE) 50 mcg/act nasal spray 2 sprays into each nostril 2 (two) times a day 32 g 3    gabapentin (NEURONTIN) 300 mg capsule          tamsulosin (FLOMAX) 0 4 mg Take 1 capsule (0 4 mg total) by mouth daily with dinner 30 capsule 0    VASCEPA 1 g CAPS PLEASE SEE ATTACHED FOR DETAILED DIRECTIONS          No current facility-administered medications on file prior to visit          Physical exam:   Temp (!) 97 3 °F (36 3 °C)   Ht 6' 2" (1 88 m)   Wt 96 2 kg (212 lb)   BMI 27 22 kg/m²      Constitutional:  Well developed, well nourished and groomed, in no acute distress       Eyes:  Extra-ocular movements intact, pupils equally round and reactive to light and accommodation, the lids and conjunctivae are normal in appearance      Head: Atraumatic, normocephalic, no visible scalp lesions, bony palpation unremarkable without stepoffs, parotid and submandibular salivary glands non-tender to palpation and without masses bilaterally      Ears:    Right ear: pinna with adequate meatoplasty, mastoid bowl clear, TM with posteriorinferior perforation with clear mucoid discharge, thickened TM, postauricular incision well healed      Left ear: pinna wnl, ear canal patent and dry, TM with extensive tympanosclerosis and dry pinpoint perforation posteriorinferiorly, postauricular incision well healed     Tuning forks:     Huerta 512 Hz right  Rinne 512 Hz:              right air > bone              left air > bone  CNVII I              Right: I/VI              Left: I/VI     Nose: anterior rhinoscopy shows a patent nasal airway without masses, lesions or polyps     Mouth/oral cavity: no masses and palate elevates symmetrically  TMJs nontender to palpation     Dentition: intact     Oropharynx:  Base of tongue soft and without masses, tonsils bilaterally unremarkable, soft palate mucosa unremarkable       Neck:  No visible or palpable cervical lesions or lymphadenopathy, thyroid gland is normal in size and symmetry and without masses, normal laryngeal elevation with swallowing      Respiratory:  Normal respiratory effort without evidence of retractions or use of accessory muscles      Integument:  Normal appearing without observed masses or lesions      Neurologic:  Cranial nerves II-XII intact bilaterally  No spontaneous or gaze evoked nystagmus  Gait steady      Psychiatric:  Alert and oriented to time, place and person, normal affect      Chest: clear to auscultation    Cardiac: regular rate and rhythm    Abdomen: nondistended soft nontender    Extremities: no CCE     Results reviewed; images from any scan have been personally reviewed:     Audiogram: 3/3/2022  Right ear: moderately severe improving to mild then sloping to profound mixed hearing loss  SRT 55 dB WR 88%  Left ear: mild low frequency sensorineural hearing loss improving to normal at 1000Hz then sloping to moderate high frequency sensorineural hearing loss SRT 25 dB WR 96%  Tracings reviewed by Dr Juan Jose Roberts, who agrees with the impression as noted above      Tympanogram: 3/3/2022  Right ear: type B large volume  Left ear: type CNS  Tracings reviewed by Dr Juan Jose Roberts, who agrees with the impression as noted above      Audiogram: 9/29/2021  Right ear: moderately severe improving to mild then sloping to moderately severe mixed hearing loss  SRT 45 dB WR 80%  Left ear: normal 250-2000Hz sloping to moderate high frequency sensorineural hearing loss SRT 15 dB WR 96%  Tracings reviewed by Dr Juan Jose Roberts, who agrees with the impression as noted above      Tympanogram: 9/29/2021  Right ear: type B large volume  Left ear: type A  Tracings reviewed by Dr Juan Jose Roberts, who agrees with the impression as noted above      IMAGING:     CT of the temporal bones without contrast 11/6/2021:  Right ear: canal wall down mastoidectomy cavity with some undrilled cells with fluid density, dehiscent tegmen mastoideum, fluid coating mastoid bowl and tympanic membrane, absent incus, head of malleus present, possible part of stapes superstructure present, normal inner ear, tympanic membrane perforation  Left ear: canal wall up mastoidectomy cavity, some intact air cells with fluid v  soft tissue density, thinned tegmen mastoideum, intact malleus and stapes, PORP v  christophe prosthesis in place  Images reviewed by Dr Juan Jose Roberts, who agrees with the impression as noted above        Assessment/Plan:      1  Chronic atticoantral suppurative otitis media of right ear     2  Ossicular chain disarticulation, right     3  Tegmen defect of base of skull     4  Status post mastoidectomy     5  Mixed conductive and sensorineural hearing loss of right ear with restricted hearing of left ear     6   Sensorineural hearing loss (SNHL) of left ear with restricted hearing of right ear     7  Bilateral tympanic membrane perforation           Audiogram ordered, performed and interpreted as noted above  The results of his audiograms, tympanograms, and CT temporal bones were discussed with the patient in detail  Dry ear precautions bilaterally  Right revision canal wall tympanomastoidectomy with temporalis fascia v  cartilage graft, possible ossiculoplasty, possible tegmen repair and facial nerve monitoring strongly recommended  The patient understands that the risks of the procedure include but are not limited to hearing loss, dizziness, infection, facial nerve injury/paralysis, and need for further surgery and has decided to proceed     We will schedule his surgery in the near future     Followup: 1 week after surgery

## 2022-06-16 ENCOUNTER — APPOINTMENT (EMERGENCY)
Dept: CT IMAGING | Facility: HOSPITAL | Age: 62
End: 2022-06-16
Payer: COMMERCIAL

## 2022-06-16 ENCOUNTER — APPOINTMENT (EMERGENCY)
Dept: RADIOLOGY | Facility: HOSPITAL | Age: 62
End: 2022-06-16
Payer: COMMERCIAL

## 2022-06-16 ENCOUNTER — HOSPITAL ENCOUNTER (EMERGENCY)
Facility: HOSPITAL | Age: 62
Discharge: HOME | End: 2022-06-17
Attending: EMERGENCY MEDICINE | Admitting: EMERGENCY MEDICINE
Payer: COMMERCIAL

## 2022-06-16 VITALS
TEMPERATURE: 98.3 F | OXYGEN SATURATION: 97 % | DIASTOLIC BLOOD PRESSURE: 76 MMHG | HEART RATE: 86 BPM | SYSTOLIC BLOOD PRESSURE: 162 MMHG | RESPIRATION RATE: 18 BRPM

## 2022-06-16 DIAGNOSIS — R10.10 PAIN OF UPPER ABDOMEN: Primary | ICD-10-CM

## 2022-06-16 LAB
ALBUMIN SERPL BCP-MCNC: 3.8 G/DL (ref 3.5–5)
ALP SERPL-CCNC: 96 U/L (ref 34–104)
ALT SERPL W P-5'-P-CCNC: 38 U/L (ref 7–52)
ANION GAP SERPL CALCULATED.3IONS-SCNC: 7 MMOL/L (ref 4–13)
AST SERPL W P-5'-P-CCNC: 48 U/L (ref 13–39)
BACTERIA UR QL AUTO: ABNORMAL /HPF
BASOPHILS # BLD AUTO: 0.06 THOUSANDS/ΜL (ref 0–0.1)
BASOPHILS NFR BLD AUTO: 1 % (ref 0–1)
BILIRUB SERPL-MCNC: 0.41 MG/DL (ref 0.2–1)
BILIRUB UR QL STRIP: NEGATIVE
BUN SERPL-MCNC: 16 MG/DL (ref 5–25)
CALCIUM SERPL-MCNC: 9.4 MG/DL (ref 8.4–10.2)
CARDIAC TROPONIN I PNL SERPL HS: 3 NG/L
CHLORIDE SERPL-SCNC: 105 MMOL/L (ref 96–108)
CK SERPL-CCNC: 26 U/L (ref 39–308)
CLARITY UR: CLEAR
CO2 SERPL-SCNC: 27 MMOL/L (ref 21–32)
COLOR UR: YELLOW
CREAT SERPL-MCNC: 1 MG/DL (ref 0.6–1.3)
EOSINOPHIL # BLD AUTO: 0.33 THOUSAND/ΜL (ref 0–0.61)
EOSINOPHIL NFR BLD AUTO: 4 % (ref 0–6)
ERYTHROCYTE [DISTWIDTH] IN BLOOD BY AUTOMATED COUNT: 13.1 % (ref 11.6–15.1)
GFR SERPL CREATININE-BSD FRML MDRD: 80 ML/MIN/1.73SQ M
GLUCOSE SERPL-MCNC: 125 MG/DL (ref 65–140)
GLUCOSE UR STRIP-MCNC: NEGATIVE MG/DL
HCT VFR BLD AUTO: 44.6 % (ref 36.5–49.3)
HGB BLD-MCNC: 14.6 G/DL (ref 12–17)
HGB UR QL STRIP.AUTO: NEGATIVE
IMM GRANULOCYTES # BLD AUTO: 0.03 THOUSAND/UL (ref 0–0.2)
IMM GRANULOCYTES NFR BLD AUTO: 0 % (ref 0–2)
KETONES UR STRIP-MCNC: NEGATIVE MG/DL
LEUKOCYTE ESTERASE UR QL STRIP: NEGATIVE
LIPASE SERPL-CCNC: 45 U/L (ref 11–82)
LYMPHOCYTES # BLD AUTO: 2.37 THOUSANDS/ΜL (ref 0.6–4.47)
LYMPHOCYTES NFR BLD AUTO: 31 % (ref 14–44)
MCH RBC QN AUTO: 29.4 PG (ref 26.8–34.3)
MCHC RBC AUTO-ENTMCNC: 32.7 G/DL (ref 31.4–37.4)
MCV RBC AUTO: 90 FL (ref 82–98)
MONOCYTES # BLD AUTO: 0.63 THOUSAND/ΜL (ref 0.17–1.22)
MONOCYTES NFR BLD AUTO: 8 % (ref 4–12)
MUCOUS THREADS UR QL AUTO: ABNORMAL
NEUTROPHILS # BLD AUTO: 4.28 THOUSANDS/ΜL (ref 1.85–7.62)
NEUTS SEG NFR BLD AUTO: 56 % (ref 43–75)
NITRITE UR QL STRIP: NEGATIVE
NON-SQ EPI CELLS URNS QL MICRO: ABNORMAL /HPF
NRBC BLD AUTO-RTO: 0 /100 WBCS
PH UR STRIP.AUTO: 6.5 [PH] (ref 4.5–8)
PLATELET # BLD AUTO: 293 THOUSANDS/UL (ref 149–390)
PMV BLD AUTO: 9.9 FL (ref 8.9–12.7)
POTASSIUM SERPL-SCNC: 3.7 MMOL/L (ref 3.5–5.3)
PROT SERPL-MCNC: 6.4 G/DL (ref 6.4–8.4)
PROT UR STRIP-MCNC: ABNORMAL MG/DL
RBC # BLD AUTO: 4.96 MILLION/UL (ref 3.88–5.62)
RBC #/AREA URNS AUTO: ABNORMAL /HPF
SODIUM SERPL-SCNC: 139 MMOL/L (ref 135–147)
SP GR UR STRIP.AUTO: >=1.03 (ref 1–1.03)
UROBILINOGEN UR QL STRIP.AUTO: 0.2 E.U./DL
WBC # BLD AUTO: 7.7 THOUSAND/UL (ref 4.31–10.16)
WBC #/AREA URNS AUTO: ABNORMAL /HPF

## 2022-06-16 PROCEDURE — 93005 ELECTROCARDIOGRAM TRACING: CPT

## 2022-06-16 PROCEDURE — 81001 URINALYSIS AUTO W/SCOPE: CPT

## 2022-06-16 PROCEDURE — 74176 CT ABD & PELVIS W/O CONTRAST: CPT

## 2022-06-16 PROCEDURE — 82550 ASSAY OF CK (CPK): CPT | Performed by: EMERGENCY MEDICINE

## 2022-06-16 PROCEDURE — 36415 COLL VENOUS BLD VENIPUNCTURE: CPT

## 2022-06-16 PROCEDURE — 99285 EMERGENCY DEPT VISIT HI MDM: CPT

## 2022-06-16 PROCEDURE — G1004 CDSM NDSC: HCPCS

## 2022-06-16 PROCEDURE — 96361 HYDRATE IV INFUSION ADD-ON: CPT

## 2022-06-16 PROCEDURE — 80053 COMPREHEN METABOLIC PANEL: CPT | Performed by: EMERGENCY MEDICINE

## 2022-06-16 PROCEDURE — 83690 ASSAY OF LIPASE: CPT | Performed by: EMERGENCY MEDICINE

## 2022-06-16 PROCEDURE — 71045 X-RAY EXAM CHEST 1 VIEW: CPT

## 2022-06-16 PROCEDURE — 85025 COMPLETE CBC W/AUTO DIFF WBC: CPT | Performed by: EMERGENCY MEDICINE

## 2022-06-16 PROCEDURE — 96374 THER/PROPH/DIAG INJ IV PUSH: CPT

## 2022-06-16 PROCEDURE — 99285 EMERGENCY DEPT VISIT HI MDM: CPT | Performed by: EMERGENCY MEDICINE

## 2022-06-16 PROCEDURE — 84484 ASSAY OF TROPONIN QUANT: CPT | Performed by: EMERGENCY MEDICINE

## 2022-06-16 RX ORDER — FAMOTIDINE 10 MG/ML
20 INJECTION, SOLUTION INTRAVENOUS ONCE
Status: COMPLETED | OUTPATIENT
Start: 2022-06-16 | End: 2022-06-16

## 2022-06-16 RX ADMIN — FAMOTIDINE 20 MG: 10 INJECTION, SOLUTION INTRAVENOUS at 22:32

## 2022-06-16 RX ADMIN — SODIUM CHLORIDE 1000 ML: 0.9 INJECTION, SOLUTION INTRAVENOUS at 22:32

## 2022-06-17 RX ORDER — OMEPRAZOLE 20 MG/1
20 CAPSULE, DELAYED RELEASE ORAL DAILY
Qty: 20 CAPSULE | Refills: 0 | Status: SHIPPED | OUTPATIENT
Start: 2022-06-17 | End: 2022-07-07

## 2022-06-17 NOTE — ED PROVIDER NOTES
History  Chief Complaint   Patient presents with    Abdominal Pain     [facility]-s/p "right ear reconstruction" surgery on , intermittent upper abdominal pain onset 12p today, denies nausea vomiting fever or chills, last BM 4 days ago      Patient is a 57year old male with intermittent upper abdominal pain since this afternoon and again tonight at about 2000  No N/V/D  No fever  No urinary sx  No GI bleeding  No abdominal surgery  Had recent R ear surgery  No chest pain  No sob  No cough  Declines pain medication  Was last seen at [facility] AND [facility] ED on 19 for nephrolithiasis  [facility] website checked on this patient and last Rx filled was on 22 for vicodin for 2 day supply  Pain not worse with eating  Denies etoh use  History provided by:  Patient and spouse   used: No    Abdominal Pain  Associated symptoms: no chest pain, no cough, no diarrhea, no fever, no nausea, no shortness of breath and no vomiting        Prior to Admission Medications   Prescriptions Last Dose Informant Patient Reported?  Taking?   amoxicillin (AMOXIL) 500 mg capsule   No No   Sig: Take 1 capsule (500 mg total) by mouth every 8 (eight) hours for 5 days   clotrimazole 1 % external solution  Self No No   Sig: Apply 1 application topically 2 (two) times a day   Patient not taking: Reported on 2022   dexamethasone (MAXIDEX) 0 1 % ophthalmic suspension   No No   Sig: Administer 2 drops into the left eye 2 (two) times a day   Patient not taking: Reported on 2022   fluticasone (FLONASE) 50 mcg/act nasal spray   No No   Si sprays into each nostril daily   Patient not taking: Reported on 2022   fluticasone (FLONASE) 50 mcg/act nasal spray   No No   Si sprays into each nostril 2 (two) times a day   Patient not taking: Reported on 2022   ondansetron (ZOFRAN) 4 mg tablet   No No   Sig: Take 1 tablet (4 mg total) by mouth every 8 (eight) hours as needed for nausea or vomiting   tamsulosin (FLOMAX) 0 4 mg  Self No No   Sig: Take 1 capsule (0 4 mg total) by mouth daily with dinner   Patient not taking: Reported on 6/6/2022      Facility-Administered Medications: None       Past Medical History:   Diagnosis Date    Arthritis of back     Kidney stone     Scoliosis     Solitary kidney, congenital        Past Surgical History:   Procedure Laterality Date    MT TYMPANOPLAS/MASTOIDEC,REBLD OSSICLES Right 6/13/2022    Procedure: RIGHT REVISION CANAL WALL DOWN TYMPANOMASTOIDECTOMY WITH OSSICULOPLASTY AND FACIAL NERVE MONITORING;  Surgeon: Kaylee Saldaña MD;  Location: [facility];  Service: ENT    WISDOM TOOTH EXTRACTION         Family History   Problem Relation Age of Onset    Cancer Mother      I have reviewed and agree with the history as documented  E-Cigarette/Vaping    E-Cigarette Use Never User      E-Cigarette/Vaping Substances    Nicotine No     THC No     CBD No     Flavoring No     Other No     Unknown No      Social History     Tobacco Use    Smoking status: Current Every Day Smoker     Packs/day: 1 00     Types: Cigarettes    Smokeless tobacco: Never Used   Vaping Use    Vaping Use: Never used   Substance Use Topics    Alcohol use: Not Currently    Drug use: Never       Review of Systems   Constitutional: Negative for fever  Respiratory: Negative for cough and shortness of breath  Cardiovascular: Negative for chest pain  Gastrointestinal: Positive for abdominal pain  Negative for blood in stool, diarrhea, nausea and vomiting  Genitourinary: Negative for difficulty urinating  All other systems reviewed and are negative  Physical Exam  Physical Exam  Vitals and nursing note reviewed  Constitutional:       General: He is in acute distress (mild)  HENT:      Head: Normocephalic and atraumatic  Ears:      Comments: Dressing R ear  Mouth/Throat:      Mouth: Mucous membranes are moist    Eyes:      General: No scleral icterus    Cardiovascular: Rate and Rhythm: Normal rate and regular rhythm  Heart sounds: Normal heart sounds  No murmur heard  Pulmonary:      Effort: Pulmonary effort is normal  No respiratory distress  Breath sounds: Normal breath sounds  Abdominal:      General: Bowel sounds are normal  There is no distension  Palpations: Abdomen is soft  Tenderness: There is abdominal tenderness (diffuse upper)  There is no right CVA tenderness, left CVA tenderness, guarding or rebound  Musculoskeletal:         General: No deformity  Cervical back: Normal range of motion and neck supple  Right lower leg: No edema  Left lower leg: No edema  Skin:     General: Skin is warm and dry  Coloration: Skin is not jaundiced  Neurological:      General: No focal deficit present  Mental Status: He is alert and oriented to person, place, and time     Psychiatric:         Mood and Affect: Mood normal          Vital Signs  ED Triage Vitals   Temperature Pulse Respirations Blood Pressure SpO2   06/16/22 2148 06/16/22 2138 06/16/22 2138 06/16/22 2138 06/16/22 2138   98 3 °F (36 8 °C) 66 18 166/75 96 %      Temp Source Heart Rate Source Patient Position - Orthostatic VS BP Location FiO2 (%)   06/16/22 2138 06/16/22 2138 06/16/22 2138 06/16/22 2138 --   Oral Monitor Sitting Right arm       Pain Score       06/16/22 2138       1           Vitals:    06/16/22 2138 06/16/22 2230   BP: 166/75 162/76   Pulse: 66 86   Patient Position - Orthostatic VS: Sitting Sitting         Visual Acuity      ED Medications  Medications   sodium chloride 0 9 % bolus 1,000 mL (1,000 mL Intravenous New Bag 6/16/22 2232)   Famotidine (PF) (PEPCID) injection 20 mg (20 mg Intravenous Given 6/16/22 2232)   barium (READI-CAT 2) suspension 900 mL (450 mL Oral Given 6/16/22 2224)       Diagnostic Studies  Results Reviewed     Procedure Component Value Units Date/Time    Urine Microscopic [[id number]]  (Abnormal) Collected: 06/16/22 2238    Lab Status: Final result Specimen: Urine, Clean Catch Updated: 06/16/22 2349     RBC, UA 1-2 /hpf      WBC, UA None Seen /hpf      Epithelial Cells None Seen /hpf      Bacteria, UA None Seen /hpf      MUCUS THREADS Occasional    CK Total with Reflex CKMB [[id number]]  (Abnormal) Collected: 06/16/22 2204    Lab Status: Final result Specimen: Blood from Arm, Right Updated: 06/16/22 2300     Total CK 26 U/L     HS Troponin 0hr (reflex protocol) [[id number]]  (Normal) Collected: 06/16/22 2220    Lab Status: Final result Specimen: Blood from Arm, Right Updated: 06/16/22 2258     hs TnI 0hr 3 ng/L     Urine Macroscopic, POC [[id number]]  (Abnormal) Collected: 06/16/22 2238    Lab Status: Final result Specimen: Urine Updated: 06/16/22 2239     Color, UA Yellow     Clarity, UA Clear     pH, UA 6 5     Leukocytes, UA Negative     Nitrite, UA Negative     Protein, UA Trace mg/dl      Glucose, UA Negative mg/dl      Ketones, UA Negative mg/dl      Urobilinogen, UA 0 2 E U /dl      Bilirubin, UA Negative     Blood, UA Negative     Specific Gravity, UA >=1 030    Narrative:      CLINITEK RESULT    Lipase [[id number]]  (Normal) Collected: 06/16/22 2204    Lab Status: Final result Specimen: Blood from Arm, Right Updated: 06/16/22 2231     Lipase 45 u/L     Comprehensive metabolic panel [[id number]]  (Abnormal) Collected: 06/16/22 2204    Lab Status: Final result Specimen: Blood from Arm, Right Updated: 06/16/22 2231     Sodium 139 mmol/L      Potassium 3 7 mmol/L      Chloride 105 mmol/L      CO2 27 mmol/L      ANION GAP 7 mmol/L      BUN 16 mg/dL      Creatinine 1 00 mg/dL      Glucose 125 mg/dL      Calcium 9 4 mg/dL      AST 48 U/L      ALT 38 U/L      Alkaline Phosphatase 96 U/L      Total Protein 6 4 g/dL      Albumin 3 8 g/dL      Total Bilirubin 0 41 mg/dL      eGFR 80 ml/min/1 73sq m     Narrative:      [facility] guidelines for Chronic Kid[facility] Disease (CKD):     Stage 1 with normal or high GFR (GFR > 90 mL/min/1 73 square meters)    Stage 2 Mild CKD (GFR = 60-89 mL/min/1 73 square meters)    Stage 3A Moderate CKD (GFR = 45-59 mL/min/1 73 square meters)    Stage 3B Moderate CKD (GFR = 30-44 mL/min/1 73 square meters)    Stage 4 Severe CKD (GFR = 15-29 mL/min/1 73 square meters)    Stage 5 End Stage CKD (GFR <15 mL/min/1 73 square meters)  Note: GFR calculation is accurate only with a steady state creatinine    CBC and differential [[id number]] Collected: 06/16/22 2204    Lab Status: Final result Specimen: Blood from Arm, Right Updated: 06/16/22 2218     WBC 7 70 Thousand/uL      RBC 4 96 Million/uL      Hemoglobin 14 6 g/dL      Hematocrit 44 6 %      MCV 90 fL      MCH 29 4 pg      MCHC 32 7 g/dL      RDW 13 1 %      MPV 9 9 fL      Platelets 071 Thousands/uL      nRBC 0 /100 WBCs      Neutrophils Relative 56 %      Immat GRANS % 0 %      Lymphocytes Relative 31 %      Monocytes Relative 8 %      Eosinophils Relative 4 %      Basophils Relative 1 %      Neutrophils Absolute 4 28 Thousands/µL      Immature Grans Absolute 0 03 Thousand/uL      Lymphocytes Absolute 2 37 Thousands/µL      Monocytes Absolute 0 63 Thousand/µL      Eosinophils Absolute 0 33 Thousand/µL      Basophils Absolute 0 06 Thousands/µL                  CT abdomen pelvis wo contrast   ED Interpretation by Briana Ceron MD (06/17 0102)   FINDINGS:     ABDOMEN     LOWER CHEST:  Clear lung bases      LIVER/BILIARY TREE:  Unremarkable      GALLBLADDER:  Gallstones in the fundus and neck  No evidence of acute      SPLEEN:  Stable area of decreased attenuation suggesting a chronic infarct      PANCREAS:  Unremarkable      ADRENAL GLANDS:  Stable thickening of the left adrenal gland could represent hypertrophy and an adenoma        KIDNEYS/URETERS:  Absent left kidney  Right parapelvic cysts    No obstructive uropathy      STOMACH AND BOWEL: Contrast is present in the stomach and small bowel Diverticulosis without evidence of diverticulitis or colitis      APPENDIX:  Normal appendix      ABDOMINOPELVIC CAVITY:  No ascites  No pneumoperitoneum  No lymphadenopathy      VESSELS:  No abdominal aortic aneurysm      PELVIS     REPRODUCTIVE ORGANS:  Mild prostate enlargement      URINARY BLADDER:  Unremarkable      ABDOMINAL WALL/INGUINAL REGIONS:  Unremarkable      OSSEOUS STRUCTURES:  Disc degenerative change at L4-5 results i   n mild to moderate central canal stenosis and moderate to severe neural foraminal narrowing      IMPRESSION:     No evidence of bowel obstruction, colitis or diverticulitis  Normal appendix            Workstation performed: [id number]      Final Result by Pelon Arambula MD (06/17 0100)      No evidence of bowel obstruction, colitis or diverticulitis  Normal appendix  Workstation performed: [id number]         XR chest 1 view portable   ED Interpretation by Pedro Pablo Krishnamurthy MD (06/16 2244)   No acute disease read by me  Final Result by Svetlana Hurt MD (06/17 9951)      Subtle left basilar infiltrate and/or atelectasis noted  Workstation performed: [id number]                    Procedures  ECG 12 Lead Documentation Only    Date/Time: 6/16/2022 10:28 PM  Performed by: Pedro Pablo Krishnamurthy MD  Authorized by: Pedro Pablo Krishnamurthy MD     Indications / Diagnosis:  Abdominal pain  ECG reviewed by me, the ED Provider: yes    Patient location:  ED  Previous ECG:     Previous ECG:  Compared to current    Comparison ECG info:  6/4/22    Similarity:  No change  Interpretation:     Interpretation: normal    Rate:     ECG rate:  65    ECG rate assessment: normal    Rhythm:     Rhythm: sinus rhythm    Ectopy:     Ectopy: none    QRS:     QRS axis:  Normal    QRS intervals:  Normal  Conduction:     Conduction: normal    ST segments:     ST segments:  Normal  T waves:     T waves: normal               ED Course  ED Course as of 06/17/22 0114   Fri Jun 17, 2022   0006 Labs and CXR d/w patient      0111 CT d/w patient and wife with patient's permission  Nontender abdomen prior to discharge  Has been taking abx for ear surgery and will notify his own ENT     0112 I doubt infiltrate on CXR since no fever or elevated WBC or cough  Most likely atx  HEART Risk Score    Flowsheet Row Most Recent Value   Heart Score Risk Calculator    History 0 Filed at: 06/16/2022 2258   ECG 0 Filed at: 06/16/2022 2258   Age 1 Filed at: 06/16/2022 2258   Risk Factors 1 Filed at: 06/16/2022 2258   Troponin 0 Filed at: 06/16/2022 2258   HEART Score 2 Filed at: 06/16/2022 2258                                      MDM  Number of Diagnoses or Management Options  Diagnosis management comments: DDx including but not limited to: appendicitis, gastroenteritis, gastritis, PUD, GERD, gastroparesis, hepatitis, pancreatitis, colitis, enteritis, diverticulitis, food poisoning, epiploic appendagitis, mesenteric adenitis, mesenteric panniculitis, mesenteric ischemia, IBD, IBS, ileus, bowel obstruction, volvulus, internal hernia, cholecystitis, biliary colic, choledocholithiasis, perforated viscus, tumor, splenic etiology, constipation, AAA, doubt testicular torsion; renal colic, pyelonephritis, UTI, rhabdomyolysis; doubt cardiac etiology          Amount and/or Complexity of Data Reviewed  Clinical lab tests: ordered and reviewed  Tests in the radiology section of CPT®: ordered and reviewed  Decide to obtain previous medical records or to obtain history from someone other than the patient: yes  Obtain history from someone other than the patient: yes  Review and summarize past medical records: yes  Independent visualization of images, tracings, or specimens: yes        Disposition  Final diagnoses:   Pain of upper abdomen     Time reflects when diagnosis was documented in both MDM as applicable and the Disposition within this note     Time User Action Codes Description Comment    6/17/2022  1:13 AM Nkechi Flynn Add [R10 10] Pain of upper abdomen       ED Disposition     ED Disposition   Discharge    Condition   Stable    Date/Time   Fri Jun 17, 2022  1:13 AM    Comment   Manual Roup discharge to home/self care  Follow-up Information     Follow up With Specialties Details Why Contact Melvin Floyd DO Family Medicine Call today Return sooner if increased pain, fever, vomiting, diarrhea, difficulty  breathing  [address]            Patient's Medications   Discharge Prescriptions    OMEPRAZOLE (PRILOSEC) 20 MG DELAYED RELEASE CAPSULE    Take 1 capsule (20 mg total) by mouth daily for 20 days       Start Date: 6/17/2022 End Date: 7/7/2022       Order Dose: 20 mg       Quantity: 20 capsule    Refills: 0       No discharge procedures on file      PDMP Review       Value Time User    PDMP Reviewed  Yes 6/16/2022 10:12 PM Nhan Guzman MD          ED Provider  Electronically Signed by           Nhan Guzman MD  06/17/22 7540

## 2022-06-20 LAB
ATRIAL RATE: 65 BPM
P AXIS: 45 DEGREES
PR INTERVAL: 144 MS
QRS AXIS: 15 DEGREES
QRSD INTERVAL: 86 MS
QT INTERVAL: 392 MS
QTC INTERVAL: 407 MS
T WAVE AXIS: 42 DEGREES
VENTRICULAR RATE: 65 BPM

## 2022-06-20 PROCEDURE — 93010 ELECTROCARDIOGRAM REPORT: CPT | Performed by: INTERNAL MEDICINE

## 2023-01-05 DIAGNOSIS — H60.392 OTHER INFECTIVE CHRONIC OTITIS EXTERNA OF LEFT EAR: ICD-10-CM

## 2023-01-05 RX ORDER — CLOTRIMAZOLE 1 G/ML
SOLUTION TOPICAL
Qty: 15 ML | Refills: 0 | Status: SHIPPED | OUTPATIENT
Start: 2023-01-05

## 2024-05-02 PROCEDURE — 87070 CULTURE OTHR SPECIMN AEROBIC: CPT | Performed by: PHYSICIAN ASSISTANT

## 2024-05-02 PROCEDURE — 87102 FUNGUS ISOLATION CULTURE: CPT | Performed by: PHYSICIAN ASSISTANT

## 2024-05-02 PROCEDURE — 87205 SMEAR GRAM STAIN: CPT | Performed by: PHYSICIAN ASSISTANT

## 2024-05-02 PROCEDURE — 87077 CULTURE AEROBIC IDENTIFY: CPT | Performed by: PHYSICIAN ASSISTANT

## 2024-09-03 ENCOUNTER — HOSPITAL ENCOUNTER (EMERGENCY)
Facility: HOSPITAL | Age: 64
Discharge: HOME | End: 2024-09-03
Attending: EMERGENCY MEDICINE
Payer: COMMERCIAL

## 2024-09-03 ENCOUNTER — APPOINTMENT (EMERGENCY)
Dept: RADIOLOGY | Facility: HOSPITAL | Age: 64
End: 2024-09-03
Payer: COMMERCIAL

## 2024-09-03 VITALS
WEIGHT: 210 LBS | OXYGEN SATURATION: 97 % | BODY MASS INDEX: 26.95 KG/M2 | SYSTOLIC BLOOD PRESSURE: 165 MMHG | HEART RATE: 63 BPM | DIASTOLIC BLOOD PRESSURE: 75 MMHG | TEMPERATURE: 97.8 F | HEIGHT: 74 IN | RESPIRATION RATE: 14 BRPM

## 2024-09-03 DIAGNOSIS — R10.9 ABDOMINAL PAIN: Primary | ICD-10-CM

## 2024-09-03 LAB
2HR DELTA HS TROPONIN: 0 NG/L
ALBUMIN SERPL BCG-MCNC: 4.1 G/DL (ref 3.5–5)
ALP SERPL-CCNC: 104 U/L (ref 34–104)
ALT SERPL W P-5'-P-CCNC: 17 U/L (ref 7–52)
ANION GAP SERPL CALCULATED.3IONS-SCNC: 6 MMOL/L (ref 4–13)
AST SERPL W P-5'-P-CCNC: 25 U/L (ref 13–39)
ATRIAL RATE: 63 BPM
BACTERIA UR QL AUTO: ABNORMAL /HPF
BASOPHILS # BLD AUTO: 0.04 THOUSANDS/ÂΜL (ref 0–0.1)
BASOPHILS NFR BLD AUTO: 1 % (ref 0–1)
BILIRUB SERPL-MCNC: 0.57 MG/DL (ref 0.2–1)
BILIRUB UR QL STRIP: NEGATIVE
BUN SERPL-MCNC: 19 MG/DL (ref 5–25)
CALCIUM SERPL-MCNC: 9.1 MG/DL (ref 8.4–10.2)
CARDIAC TROPONIN I PNL SERPL HS: 4 NG/L
CARDIAC TROPONIN I PNL SERPL HS: 4 NG/L
CHLORIDE SERPL-SCNC: 107 MMOL/L (ref 96–108)
CLARITY UR: CLEAR
CO2 SERPL-SCNC: 26 MMOL/L (ref 21–32)
COLOR UR: ABNORMAL
CREAT SERPL-MCNC: 1.07 MG/DL (ref 0.6–1.3)
EOSINOPHIL # BLD AUTO: 0.23 THOUSAND/ÂΜL (ref 0–0.61)
EOSINOPHIL NFR BLD AUTO: 4 % (ref 0–6)
ERYTHROCYTE [DISTWIDTH] IN BLOOD BY AUTOMATED COUNT: 13.2 % (ref 11.6–15.1)
GFR SERPL CREATININE-BSD FRML MDRD: 72 ML/MIN/1.73SQ M
GLUCOSE SERPL-MCNC: 163 MG/DL (ref 65–140)
GLUCOSE UR STRIP-MCNC: NEGATIVE MG/DL
HCT VFR BLD AUTO: 45.4 % (ref 36.5–49.3)
HGB BLD-MCNC: 14.6 G/DL (ref 12–17)
HGB UR QL STRIP.AUTO: NEGATIVE
IMM GRANULOCYTES # BLD AUTO: 0.02 THOUSAND/UL (ref 0–0.2)
IMM GRANULOCYTES NFR BLD AUTO: 0 % (ref 0–2)
KETONES UR STRIP-MCNC: NEGATIVE MG/DL
LEUKOCYTE ESTERASE UR QL STRIP: NEGATIVE
LIPASE SERPL-CCNC: 24 U/L (ref 11–82)
LYMPHOCYTES # BLD AUTO: 1.2 THOUSANDS/ÂΜL (ref 0.6–4.47)
LYMPHOCYTES NFR BLD AUTO: 19 % (ref 14–44)
MCH RBC QN AUTO: 29.4 PG (ref 26.8–34.3)
MCHC RBC AUTO-ENTMCNC: 32.2 G/DL (ref 31.4–37.4)
MCV RBC AUTO: 91 FL (ref 82–98)
MONOCYTES # BLD AUTO: 0.45 THOUSAND/ÂΜL (ref 0.17–1.22)
MONOCYTES NFR BLD AUTO: 7 % (ref 4–12)
MUCOUS THREADS UR QL AUTO: ABNORMAL
NEUTROPHILS # BLD AUTO: 4.27 THOUSANDS/ÂΜL (ref 1.85–7.62)
NEUTS SEG NFR BLD AUTO: 69 % (ref 43–75)
NITRITE UR QL STRIP: NEGATIVE
NON-SQ EPI CELLS URNS QL MICRO: ABNORMAL /HPF
NRBC BLD AUTO-RTO: 0 /100 WBCS
P AXIS: 72 DEGREES
PH UR STRIP.AUTO: 7.5 [PH]
PLATELET # BLD AUTO: 238 THOUSANDS/UL (ref 149–390)
PMV BLD AUTO: 9.7 FL (ref 8.9–12.7)
POTASSIUM SERPL-SCNC: 4.5 MMOL/L (ref 3.5–5.3)
PR INTERVAL: 162 MS
PROT SERPL-MCNC: 6.6 G/DL (ref 6.4–8.4)
PROT UR STRIP-MCNC: ABNORMAL MG/DL
QRS AXIS: 39 DEGREES
QRSD INTERVAL: 84 MS
QT INTERVAL: 414 MS
QTC INTERVAL: 423 MS
RBC # BLD AUTO: 4.97 MILLION/UL (ref 3.88–5.62)
RBC #/AREA URNS AUTO: ABNORMAL /HPF
SODIUM SERPL-SCNC: 139 MMOL/L (ref 135–147)
SP GR UR STRIP.AUTO: >=1.05 (ref 1–1.03)
T WAVE AXIS: 57 DEGREES
UROBILINOGEN UR STRIP-ACNC: <2 MG/DL
VENTRICULAR RATE: 63 BPM
WBC # BLD AUTO: 6.21 THOUSAND/UL (ref 4.31–10.16)
WBC #/AREA URNS AUTO: ABNORMAL /HPF

## 2024-09-03 PROCEDURE — 93010 ELECTROCARDIOGRAM REPORT: CPT | Performed by: INTERNAL MEDICINE

## 2024-09-03 PROCEDURE — 99284 EMERGENCY DEPT VISIT MOD MDM: CPT

## 2024-09-03 PROCEDURE — 85025 COMPLETE CBC W/AUTO DIFF WBC: CPT

## 2024-09-03 PROCEDURE — 84484 ASSAY OF TROPONIN QUANT: CPT

## 2024-09-03 PROCEDURE — 36415 COLL VENOUS BLD VENIPUNCTURE: CPT

## 2024-09-03 PROCEDURE — 74177 CT ABD & PELVIS W/CONTRAST: CPT

## 2024-09-03 PROCEDURE — 81001 URINALYSIS AUTO W/SCOPE: CPT

## 2024-09-03 PROCEDURE — 93005 ELECTROCARDIOGRAM TRACING: CPT

## 2024-09-03 PROCEDURE — 80053 COMPREHEN METABOLIC PANEL: CPT

## 2024-09-03 PROCEDURE — 83690 ASSAY OF LIPASE: CPT

## 2024-09-03 RX ORDER — DICYCLOMINE HCL 20 MG
20 TABLET ORAL 2 TIMES DAILY
Qty: 20 TABLET | Refills: 0 | Status: SHIPPED | OUTPATIENT
Start: 2024-09-03

## 2024-09-03 RX ADMIN — IOHEXOL 100 ML: 350 INJECTION, SOLUTION INTRAVENOUS at 08:55

## 2024-09-03 NOTE — ED ATTENDING ATTESTATION
9/3/2024  I, Nitin Londono MD, saw and evaluated the patient. I have discussed the patient with the resident/non-physician practitioner and agree with the resident's/non-physician practitioner's findings, Plan of Care, and MDM as documented in the resident's/non-physician practitioner's note, except where noted. All available labs and Radiology studies were reviewed.  I was present for key portions of any procedure(s) performed by the resident/non-physician practitioner and I was immediately available to provide assistance.       At this point I agree with the current assessment done in the Emergency Department.  I have conducted an independent evaluation of this patient a history and physical is as follows:    ED Course     Patient presents for evaluation secondary to 1 day of lower abdominal pain and 1 episode of vomiting.  He reports having a normal bowel movement this morning.  He denies any urinary complaints.  Currently, symptoms have improved.  No additional complaints. A/P: Abdominal pain.  Will check labs, urine, and CT abdomen pelvis to evaluate for intra-abdominal pathology    Critical Care Time  Procedures

## 2024-09-03 NOTE — Clinical Note
Paulino Ball was seen and treated in our emergency department on 9/3/2024.    No restrictions            Diagnosis:     Paulino  may return to work on return date.    He may return on this date: 09/03/2024         If you have any questions or concerns, please don't hesitate to call.      Brooke M Markle, RN    ______________________________           _______________          _______________  Hospital Representative                              Date                                Time

## 2024-09-03 NOTE — Clinical Note
Paulino Ball was seen and treated in our emergency department on 9/3/2024.    No restrictions            Diagnosis:     Paulino  may return to work on return date.    He may return on this date: 09/04/2024         If you have any questions or concerns, please don't hesitate to call.      Nitin Londono MD    ______________________________           _______________          _______________  Hospital Representative                              Date                                Time

## 2024-09-03 NOTE — ED PROVIDER NOTES
History  Chief Complaint   Patient presents with    Abdominal Pain     Per ems pt was at work when he started to have lower abdominal pain, 1 episode of vomiting     Patient is a 64-year-old male with a past medical history of kidney stones, solitary right sided congenital kidney, presenting with abdominal pain that was acute in onset earlier this morning.  Pain was sharp, left upper quadrant, nonradiating, not associate with nausea, vomiting, diarrhea or any other complaint.  Further review of systems negative.  Patient had a bowel movement this morning while he was having the abdominal pain.  Patient came to the emergency department for evaluation of his pain.  Pain resolved, mild residual achiness remaining, at time of provider evaluation        Prior to Admission Medications   Prescriptions Last Dose Informant Patient Reported? Taking?   clotrimazole 1 % external solution   No No   Si drops to left ear twice a day, alternate with floxin drops but use both types of drops twice a dya   clotrimazole 1 % external solution   No No   Si drops to the left ear twice a day for 21 days   clotrimazole 1 % external solution   No No   Sig: Use 4 gtts to left ear bid x 7 days   dexamethasone (MAXIDEX) 0.1 % ophthalmic suspension   No No   Sig: Administer 2 drops into the left eye 2 (two) times a day   fluticasone (FLONASE) 50 mcg/act nasal spray   No No   Si sprays into each nostril daily   fluticasone (FLONASE) 50 mcg/act nasal spray   No No   Si sprays into each nostril 2 (two) times a day   fluticasone (FLONASE) 50 mcg/act nasal spray   No No   Sig: SPRAY 1 SPRAY INTO EACH NOSTRIL EVERY DAY   methylPREDNISolone 4 MG tablet therapy pack   No No   Sig: Use as directed on package   ofloxacin (FLOXIN) 0.3 % otic solution   No No   Sig: Use 4 gtts to right ear bid x 2 weeks   ofloxacin (FLOXIN) 0.3 % otic solution   No No   Sig: Use 4 gtts to both ears bid x 7 days   ofloxacin (FLOXIN) 0.3 % otic solution   No  No   Sig: Administer 3 drops into the left ear 2 (two) times a day Alternate with clotrimazole drops   omeprazole (PriLOSEC) 20 mg delayed release capsule   No No   Sig: Take 1 capsule (20 mg total) by mouth daily for 20 days   ondansetron (ZOFRAN) 4 mg tablet   No No   Sig: Take 1 tablet (4 mg total) by mouth every 8 (eight) hours as needed for nausea or vomiting   polymyxin b-trimethoprim (POLYTRIM) ophthalmic solution   No No   Sig: Use 4 gtts to left ear bid x 7 days   tamsulosin (FLOMAX) 0.4 mg  Self No No   Sig: Take 1 capsule (0.4 mg total) by mouth daily with dinner      Facility-Administered Medications: None       Past Medical History:   Diagnosis Date    Arthritis of back     Kidney stone     Scoliosis     Solitary kidney, congenital        Past Surgical History:   Procedure Laterality Date    MI TMPP MASTOIDECTOMY W/OSSICULAR CHAIN RECNSTJ Right 6/13/2022    Procedure: RIGHT REVISION CANAL WALL DOWN TYMPANOMASTOIDECTOMY WITH OSSICULOPLASTY AND FACIAL NERVE MONITORING;  Surgeon: Estephania Ortiz MD;  Location: [facility];  Service: ENT    WISDOM TOOTH EXTRACTION         Family History   Problem Relation Age of Onset    Cancer Mother     Diabetes Mother      I have reviewed and agree with the history as documented.    E-Cigarette/Vaping    E-Cigarette Use Never User      E-Cigarette/Vaping Substances    Nicotine No     THC No     CBD No     Flavoring No     Other No     Unknown No      Social History     Tobacco Use    Smoking status: Every Day     Current packs/day: 1.00     Types: Cigarettes    Smokeless tobacco: Current   Vaping Use    Vaping status: Never Used   Substance Use Topics    Alcohol use: Not Currently    Drug use: Never        Review of Systems   All other systems reviewed and are negative.      Physical Exam  ED Triage Vitals [09/03/24 0730]   Temperature Pulse Respirations Blood Pressure SpO2   97.8 °F (36.6 °C) 66 18 168/77 96 %      Temp Source Heart Rate Source Patient Position -  Orthostatic VS BP Location FiO2 (%)   Oral Monitor Lying Left arm --      Pain Score       4             Orthostatic Vital Signs  Vitals:    09/03/24 0730 09/03/24 0800 09/03/24 0900   BP: 168/77 145/65 165/75   Pulse: 66 60 63   Patient Position - Orthostatic VS: Lying Lying Lying       Physical Exam  Vitals and nursing note reviewed.   Constitutional:       General: He is not in acute distress.     Appearance: He is well-developed.   HENT:      Head: Normocephalic and atraumatic.   Eyes:      Conjunctiva/sclera: Conjunctivae normal.   Cardiovascular:      Rate and Rhythm: Normal rate and regular rhythm.      Heart sounds: No murmur heard.  Pulmonary:      Effort: Pulmonary effort is normal. No respiratory distress.      Breath sounds: Normal breath sounds.   Abdominal:      Palpations: Abdomen is soft.      Tenderness: There is no abdominal tenderness. There is no guarding or rebound.   Musculoskeletal:         General: No swelling.      Cervical back: Neck supple.   Skin:     General: Skin is warm and dry.      Capillary Refill: Capillary refill takes less than 2 seconds.   Neurological:      Mental Status: He is alert.   Psychiatric:         Mood and Affect: Mood normal.         ED Medications  Medications   iohexol (OMNIPAQUE) 350 MG/ML injection (MULTI-DOSE) 100 mL (100 mL Intravenous Given 9/3/24 0855)       Diagnostic Studies  Results Reviewed       Procedure Component Value Units Date/Time    HS Troponin I 2hr [[id number]]  (Normal) Collected: 09/03/24 1059    Lab Status: Final result Specimen: Blood from Line, Venous Updated: 09/03/24 1131     hs TnI 2hr 4 ng/L      Delta 2hr hsTnI 0 ng/L     Urine Microscopic [[id number]]  (Abnormal) Collected: 09/03/24 0943    Lab Status: Final result Specimen: Urine, Clean Catch Updated: 09/03/24 1010     RBC, UA None Seen /hpf      WBC, UA None Seen /hpf      Epithelial Cells None Seen /hpf      Bacteria, UA None Seen /hpf      MUCUS THREADS Occasional    UA w Reflex to  Microscopic w Reflex to Culture [[id number]]  (Abnormal) Collected: 09/03/24 0943    Lab Status: Final result Specimen: Urine, Clean Catch Updated: 09/03/24 1007     Color, UA Light Yellow     Clarity, UA Clear     Specific Gravity, UA >=1.050     pH, UA 7.5     Leukocytes, UA Negative     Nitrite, UA Negative     Protein, UA 30 (1+) mg/dl      Glucose, UA Negative mg/dl      Ketones, UA Negative mg/dl      Urobilinogen, UA <2.0 mg/dl      Bilirubin, UA Negative     Occult Blood, UA Negative    HS Troponin 0hr (reflex protocol) [[id number]]  (Normal) Collected: 09/03/24 0808    Lab Status: Final result Specimen: Blood from Arm, Right Updated: 09/03/24 0841     hs TnI 0hr 4 ng/L     Comprehensive metabolic panel [[id number]]  (Abnormal) Collected: 09/03/24 0808    Lab Status: Final result Specimen: Blood from Arm, Right Updated: 09/03/24 0839     Sodium 139 mmol/L      Potassium 4.5 mmol/L      Chloride 107 mmol/L      CO2 26 mmol/L      ANION GAP 6 mmol/L      BUN 19 mg/dL      Creatinine 1.07 mg/dL      Glucose 163 mg/dL      Calcium 9.1 mg/dL      AST 25 U/L      ALT 17 U/L      Alkaline Phosphatase 104 U/L      Total Protein 6.6 g/dL      Albumin 4.1 g/dL      Total Bilirubin 0.57 mg/dL      eGFR 72 ml/min/1.73sq m     Narrative:      National Kidney Disease Foundation guidelines for Chronic Kidney Disease (CKD):     Stage 1 with normal or high GFR (GFR > 90 mL/min/1.73 square meters)    Stage 2 Mild CKD (GFR = 60-89 mL/min/1.73 square meters)    Stage 3A Moderate CKD (GFR = 45-59 mL/min/1.73 square meters)    Stage 3B Moderate CKD (GFR = 30-44 mL/min/1.73 square meters)    Stage 4 Severe CKD (GFR = 15-29 mL/min/1.73 square meters)    Stage 5 End Stage CKD (GFR <15 mL/min/1.73 square meters)  Note: GFR calculation is accurate only with a steady state creatinine    Lipase [[id number]]  (Normal) Collected: 09/03/24 0808    Lab Status: Final result Specimen: Blood from Arm, Right Updated: 09/03/24 0839     Lipase 24 u/L      CBC and differential [[id number]] Collected: 09/03/24 0808    Lab Status: Final result Specimen: Blood from Arm, Right Updated: 09/03/24 0817     WBC 6.21 Thousand/uL      RBC 4.97 Million/uL      Hemoglobin 14.6 g/dL      Hematocrit 45.4 %      MCV 91 fL      MCH 29.4 pg      MCHC 32.2 g/dL      RDW 13.2 %      MPV 9.7 fL      Platelets 238 Thousands/uL      nRBC 0 /100 WBCs      Segmented % 69 %      Immature Grans % 0 %      Lymphocytes % 19 %      Monocytes % 7 %      Eosinophils Relative 4 %      Basophils Relative 1 %      Absolute Neutrophils 4.27 Thousands/µL      Absolute Immature Grans 0.02 Thousand/uL      Absolute Lymphocytes 1.20 Thousands/µL      Absolute Monocytes 0.45 Thousand/µL      Eosinophils Absolute 0.23 Thousand/µL      Basophils Absolute 0.04 Thousands/µL                    CT abdomen pelvis w contrast   Final Result by Ra Dong MD (09/03 0917)      1. Cholelithiasis without evidence of pericholecystic inflammatory change. No biliary ductal dilatation.   2. Hypodense lesion within the spleen of unclear etiology, possibly hemangioma. It has however not changed in size since 2019   3. Nonobstructing right kidney upper pole calculus. Renal cysts, subcentimeter hypodense nodules and probable parapelvic cysts. Absent left kidney.   4. Intra-abdominal atherosclerotic changes including noncalcified thrombus located for example within the infrarenal aorta and along the inferior wall of the superior mesenteric artery. No findings that would suggest bowel ischemia            Workstation performed: [id number]               Procedures  Procedures      ED Course                             SBIRT 22yo+      Flowsheet Row Most Recent Value   Initial Alcohol Screen: US AUDIT-C     1. How often do you have a drink containing alcohol? 0 Filed at: 09/03/2024 0736   Audit-C Score 0 Filed at: 09/03/2024 0736   VANESSA: How many times in the past year have you...    Used an illegal drug or used a  prescription medication for non-medical reasons? Never Filed at: 09/03/2024 0736                  Medical Decision Making  Patient is 64M with PMH of solitary R kidney who presents to the ED with abd pain.    Vital signs stable. On exam no abd ttp.    History and physical exam most consistent with transient gas pain. However, differential diagnosis included but not limited to acs, sbo, pancreatitis. Plan cardiac and abdominal workup with CT, symptomatic management    View ED course above for further discussion on patient workup.    All labs reviewed and utilized in the medical decision making process  All radiology studies independently viewed by me and interpreted by the radiologist.  I reviewed all testing with the patient.     Upon re-evaluation patient's pain improved, no acute findings on laboratory or imaging workup.  Will discharge patient with return precautions.  Patient expressed understanding of the return precautions.  Instructed patient to follow-up with his PCP.  All questions answered prior to discharge      Amount and/or Complexity of Data Reviewed  Labs: ordered.  Radiology: ordered.    Risk  Prescription drug management.          Disposition  Final diagnoses:   Abdominal pain     Time reflects when diagnosis was documented in both MDM as applicable and the Disposition within this note       Time User Action Codes Description Comment    9/3/2024 10:11 AM Paulino Miranda Add [R10.9] Abdominal pain           ED Disposition       ED Disposition   Discharge    Condition   Stable    Date/Time   Tue Sep 3, 2024 1011    Comment   Paulino Ball discharge to home/self care.                   Follow-up Information       Follow up With Specialties Details Why Contact Info Additional Information    Juan F Toure,  Family Medicine   [address] [place] [id number] [phone]       [facility] Emergency Department Emergency Medicine Go to  If symptoms worsen [address] [place] [address]  [place] [address]  [phone] [facility], [address], [place], [address]   [phone]            Discharge Medication List as of 9/3/2024 12:09 PM        CONTINUE these medications which have NOT CHANGED    Details   !! clotrimazole 1 % external solution 2 drops to left ear twice a day, alternate with floxin drops but use both types of drops twice a dya, Normal      !! clotrimazole 1 % external solution 2 drops to the left ear twice a day for 21 days, Normal      !! clotrimazole 1 % external solution Use 4 gtts to left ear bid x 7 days, Normal      dexamethasone (MAXIDEX) 0.1 % ophthalmic suspension Administer 2 drops into the left eye 2 (two) times a day, Starting Wed 2/5/2020, Normal      !! fluticasone (FLONASE) 50 mcg/act nasal spray 2 sprays into each nostril daily, Starting Mon 2/8/2021, Normal      !! fluticasone (FLONASE) 50 mcg/act nasal spray 2 sprays into each nostril 2 (two) times a day, Starting Wed 8/4/2021, Normal      !! fluticasone (FLONASE) 50 mcg/act nasal spray SPRAY 1 SPRAY INTO EACH NOSTRIL EVERY DAY, Normal      methylPREDNISolone 4 MG tablet therapy pack Use as directed on package, Normal      !! ofloxacin (FLOXIN) 0.3 % otic solution Use 4 gtts to right ear bid x 2 weeks, Normal      !! ofloxacin (FLOXIN) 0.3 % otic solution Use 4 gtts to both ears bid x 7 days, Normal      !! ofloxacin (FLOXIN) 0.3 % otic solution Administer 3 drops into the left ear 2 (two) times a day Alternate with clotrimazole drops, Starting Thu 10/5/2023, Normal      omeprazole (PriLOSEC) 20 mg delayed release capsule Take 1 capsule (20 mg total) by mouth daily for 20 days, Starting Fri 6/17/2022, Until Thu 7/7/2022, Normal      ondansetron (ZOFRAN) 4 mg tablet Take 1 tablet (4 mg total) by mouth every 8 (eight) hours as needed for nausea or vomiting, Starting Mon 6/13/2022, Normal      polymyxin b-trimethoprim (POLYTRIM) ophthalmic solution Use 4  gtts to left ear bid x 7 days, Normal      tamsulosin (FLOMAX) 0.4 mg Take 1 capsule (0.4 mg total) by mouth daily with dinner, Starting Tue 8/20/2019, Normal       !! - Potential duplicate medications found. Please discuss with provider.        No discharge procedures on file.    PDMP Review         Value Time User    PDMP Reviewed  Yes 6/16/2022 10:12 PM Merrill Marx MD             ED Provider  Attending physically available and evaluated Paulino Ball. I managed the patient along with the ED Attending.    Electronically Signed by           Paulino Miranda MD  09/03/24 4806

## (undated) DEVICE — DRAPE CRANI

## (undated) DEVICE — IV CATH INTROCAN 18G X 1 1/4 SAFETY

## (undated) DEVICE — SYRINGE BULB 2 OZ

## (undated) DEVICE — SURGIFOAM 8.5 X 12.5

## (undated) DEVICE — PROXIMATE PLUS MD MULTI-DIRECTIONAL RELEASE SKIN STAPLERS CONTAINS 35 STAINLESS STEEL STAPLES APPROXIMATE CLOSED DIMENSIONS: 6.9MM X 3.9MM WIDE: Brand: PROXIMATE

## (undated) DEVICE — CURITY PLAIN PACKING STRIP: Brand: CURITY

## (undated) DEVICE — SPONGE 4 X 4 XRAY 16 PLY STRL LF RFD

## (undated) DEVICE — TOOL 75BA60F LGND 7.5CM 6MM BALL FLTD F: Brand: MIDAS REX ™

## (undated) DEVICE — GLOVE SRG BIOGEL 6.5

## (undated) DEVICE — TOOL 75BA40 LEGEND 7.5CM 4MM BALL FLUTED: Brand: MIDAS REX ™

## (undated) DEVICE — TELFA NON-ADHERENT ABSORBENT DRESSING: Brand: TELFA

## (undated) DEVICE — DRESSING EAR GLASSCOCK ADULT LRG

## (undated) DEVICE — STERILE EAR PACK: Brand: CARDINAL HEALTH

## (undated) DEVICE — ANTI-FOG SOLUTION WITH FOAM PAD: Brand: DEVON

## (undated) DEVICE — DRAPE SURGIKIT SADDLE BAG

## (undated) DEVICE — SUT VICRYL 3-0 SH 27 IN J416H

## (undated) DEVICE — ADHESIVE SKIN HIGH VISCOSITY EXOFIN 1ML

## (undated) DEVICE — STRL PENROSE DRAIN 18" X 1/4": Brand: CARDINAL HEALTH

## (undated) DEVICE — BIPOLAR CORD DISP

## (undated) DEVICE — TOOL 75BA30D LGND 7.5CM 3MM BALL DMD: Brand: MIDAS REX®

## (undated) DEVICE — SUT PLAIN 5-0 PC-1 18 IN 1915G

## (undated) DEVICE — TUBING IRD400 5PK IPC LOW PRO IRRIGATION: Brand: MIDAS REX®

## (undated) DEVICE — ELECTRODE 8227410 PAIRED 2 CH SET ROHS